# Patient Record
Sex: FEMALE | Race: WHITE | NOT HISPANIC OR LATINO | Employment: OTHER | URBAN - METROPOLITAN AREA
[De-identification: names, ages, dates, MRNs, and addresses within clinical notes are randomized per-mention and may not be internally consistent; named-entity substitution may affect disease eponyms.]

---

## 2023-06-03 LAB
ALANINE AMINOTRANSFERASE (SGPT) (U/L) IN SER/PLAS: 11 U/L (ref 7–45)
ALBUMIN (G/DL) IN SER/PLAS: 4 G/DL (ref 3.4–5)
ALKALINE PHOSPHATASE (U/L) IN SER/PLAS: 58 U/L (ref 33–136)
ANION GAP IN SER/PLAS: 8 MMOL/L (ref 10–20)
ASPARTATE AMINOTRANSFERASE (SGOT) (U/L) IN SER/PLAS: 14 U/L (ref 9–39)
BILIRUBIN TOTAL (MG/DL) IN SER/PLAS: 0.5 MG/DL (ref 0–1.2)
CALCIUM (MG/DL) IN SER/PLAS: 9.7 MG/DL (ref 8.6–10.3)
CARBON DIOXIDE, TOTAL (MMOL/L) IN SER/PLAS: 30 MMOL/L (ref 21–32)
CHLORIDE (MMOL/L) IN SER/PLAS: 106 MMOL/L (ref 98–107)
CHOLESTEROL (MG/DL) IN SER/PLAS: 191 MG/DL (ref 0–199)
CHOLESTEROL IN HDL (MG/DL) IN SER/PLAS: 47.7 MG/DL
CHOLESTEROL/HDL RATIO: 4
COBALAMIN (VITAMIN B12) (PG/ML) IN SER/PLAS: 350 PG/ML (ref 211–911)
CREATININE (MG/DL) IN SER/PLAS: 0.86 MG/DL (ref 0.5–1.05)
ERYTHROCYTE DISTRIBUTION WIDTH (RATIO) BY AUTOMATED COUNT: 12.7 % (ref 11.5–14.5)
ERYTHROCYTE MEAN CORPUSCULAR HEMOGLOBIN CONCENTRATION (G/DL) BY AUTOMATED: 32.2 G/DL (ref 32–36)
ERYTHROCYTE MEAN CORPUSCULAR VOLUME (FL) BY AUTOMATED COUNT: 90 FL (ref 80–100)
ERYTHROCYTES (10*6/UL) IN BLOOD BY AUTOMATED COUNT: 5.03 X10E12/L (ref 4–5.2)
GFR FEMALE: 71 ML/MIN/1.73M2
GLUCOSE (MG/DL) IN SER/PLAS: 104 MG/DL (ref 74–99)
HEMATOCRIT (%) IN BLOOD BY AUTOMATED COUNT: 45.4 % (ref 36–46)
HEMOGLOBIN (G/DL) IN BLOOD: 14.6 G/DL (ref 12–16)
LDL: 128 MG/DL (ref 0–99)
LEUKOCYTES (10*3/UL) IN BLOOD BY AUTOMATED COUNT: 7.9 X10E9/L (ref 4.4–11.3)
PLATELETS (10*3/UL) IN BLOOD AUTOMATED COUNT: 188 X10E9/L (ref 150–450)
POTASSIUM (MMOL/L) IN SER/PLAS: 4.4 MMOL/L (ref 3.5–5.3)
PROTEIN TOTAL: 6.7 G/DL (ref 6.4–8.2)
SODIUM (MMOL/L) IN SER/PLAS: 140 MMOL/L (ref 136–145)
TRIGLYCERIDE (MG/DL) IN SER/PLAS: 78 MG/DL (ref 0–149)
UREA NITROGEN (MG/DL) IN SER/PLAS: 19 MG/DL (ref 6–23)
VLDL: 16 MG/DL (ref 0–40)

## 2023-08-28 LAB
ANION GAP IN SER/PLAS: 10 MMOL/L (ref 10–20)
CALCIUM (MG/DL) IN SER/PLAS: 9.6 MG/DL (ref 8.6–10.3)
CARBON DIOXIDE, TOTAL (MMOL/L) IN SER/PLAS: 31 MMOL/L (ref 21–32)
CHLORIDE (MMOL/L) IN SER/PLAS: 103 MMOL/L (ref 98–107)
CHOLESTEROL (MG/DL) IN SER/PLAS: 127 MG/DL (ref 0–199)
CHOLESTEROL IN HDL (MG/DL) IN SER/PLAS: 41.3 MG/DL
CHOLESTEROL/HDL RATIO: 3.1
CREATININE (MG/DL) IN SER/PLAS: 0.95 MG/DL (ref 0.5–1.05)
ESTIMATED AVERAGE GLUCOSE FOR HBA1C: 131 MG/DL
GFR FEMALE: 63 ML/MIN/1.73M2
GLUCOSE (MG/DL) IN SER/PLAS: 96 MG/DL (ref 74–99)
HEMOGLOBIN A1C/HEMOGLOBIN TOTAL IN BLOOD: 6.2 %
LDL: 64 MG/DL (ref 0–99)
POTASSIUM (MMOL/L) IN SER/PLAS: 4 MMOL/L (ref 3.5–5.3)
SODIUM (MMOL/L) IN SER/PLAS: 140 MMOL/L (ref 136–145)
TRIGLYCERIDE (MG/DL) IN SER/PLAS: 107 MG/DL (ref 0–149)
UREA NITROGEN (MG/DL) IN SER/PLAS: 22 MG/DL (ref 6–23)
VLDL: 21 MG/DL (ref 0–40)

## 2023-09-01 ENCOUNTER — OFFICE VISIT (OUTPATIENT)
Dept: PRIMARY CARE | Facility: CLINIC | Age: 73
End: 2023-09-01
Payer: MEDICARE

## 2023-09-01 VITALS
TEMPERATURE: 96.8 F | HEART RATE: 70 BPM | BODY MASS INDEX: 29.23 KG/M2 | WEIGHT: 165 LBS | DIASTOLIC BLOOD PRESSURE: 80 MMHG | OXYGEN SATURATION: 97 % | SYSTOLIC BLOOD PRESSURE: 130 MMHG

## 2023-09-01 DIAGNOSIS — R73.01 IMPAIRED FASTING GLUCOSE: ICD-10-CM

## 2023-09-01 DIAGNOSIS — M48.02 CERVICAL STENOSIS OF SPINAL CANAL: ICD-10-CM

## 2023-09-01 DIAGNOSIS — G47.00 INSOMNIA, UNSPECIFIED TYPE: ICD-10-CM

## 2023-09-01 DIAGNOSIS — C67.9 UROTHELIAL CARCINOMA OF BLADDER (MULTI): ICD-10-CM

## 2023-09-01 DIAGNOSIS — E78.2 MIXED HYPERLIPIDEMIA: ICD-10-CM

## 2023-09-01 DIAGNOSIS — J96.11 CHRONIC HYPOXEMIC RESPIRATORY FAILURE (MULTI): Primary | ICD-10-CM

## 2023-09-01 DIAGNOSIS — G47.09 OTHER INSOMNIA: ICD-10-CM

## 2023-09-01 DIAGNOSIS — F32.A ANXIETY AND DEPRESSION: ICD-10-CM

## 2023-09-01 DIAGNOSIS — I10 BENIGN ESSENTIAL HYPERTENSION: ICD-10-CM

## 2023-09-01 DIAGNOSIS — G25.81 RESTLESS LEGS: ICD-10-CM

## 2023-09-01 DIAGNOSIS — I70.0 ATHEROSCLEROSIS OF AORTA (CMS-HCC): ICD-10-CM

## 2023-09-01 DIAGNOSIS — E03.9 HYPOTHYROIDISM, UNSPECIFIED TYPE: ICD-10-CM

## 2023-09-01 DIAGNOSIS — I65.01 VERTEBRAL ARTERY OCCLUSION, RIGHT: ICD-10-CM

## 2023-09-01 DIAGNOSIS — F41.9 ANXIETY AND DEPRESSION: ICD-10-CM

## 2023-09-01 DIAGNOSIS — K21.9 GASTROESOPHAGEAL REFLUX DISEASE, UNSPECIFIED WHETHER ESOPHAGITIS PRESENT: ICD-10-CM

## 2023-09-01 PROBLEM — J44.9 COPD (CHRONIC OBSTRUCTIVE PULMONARY DISEASE) (MULTI): Status: ACTIVE | Noted: 2023-09-01

## 2023-09-01 PROBLEM — L30.9 ECZEMA: Status: ACTIVE | Noted: 2023-09-01

## 2023-09-01 PROBLEM — N18.30 STAGE 3 CHRONIC KIDNEY DISEASE (MULTI): Status: ACTIVE | Noted: 2023-09-01

## 2023-09-01 PROBLEM — G47.33 OBSTRUCTIVE SLEEP APNEA SYNDROME: Status: ACTIVE | Noted: 2021-02-09

## 2023-09-01 PROBLEM — J44.1 CHRONIC OBSTRUCTIVE PULMONARY DISEASE WITH (ACUTE) EXACERBATION (MULTI): Status: ACTIVE | Noted: 2020-07-14

## 2023-09-01 PROBLEM — J30.9 ALLERGIC RHINITIS: Status: ACTIVE | Noted: 2023-09-01

## 2023-09-01 PROBLEM — M15.9 GENERALIZED OSTEOARTHRITIS: Status: ACTIVE | Noted: 2023-09-01

## 2023-09-01 PROBLEM — D49.4 NEOPLASM OF BLADDER: Status: ACTIVE | Noted: 2021-02-09

## 2023-09-01 PROBLEM — C67.4 MALIGNANT NEOPLASM OF POSTERIOR WALL OF URINARY BLADDER (MULTI): Status: ACTIVE | Noted: 2020-07-14

## 2023-09-01 PROCEDURE — 3075F SYST BP GE 130 - 139MM HG: CPT | Performed by: FAMILY MEDICINE

## 2023-09-01 PROCEDURE — 1036F TOBACCO NON-USER: CPT | Performed by: FAMILY MEDICINE

## 2023-09-01 PROCEDURE — 1159F MED LIST DOCD IN RCRD: CPT | Performed by: FAMILY MEDICINE

## 2023-09-01 PROCEDURE — 1160F RVW MEDS BY RX/DR IN RCRD: CPT | Performed by: FAMILY MEDICINE

## 2023-09-01 PROCEDURE — 1157F ADVNC CARE PLAN IN RCRD: CPT | Performed by: FAMILY MEDICINE

## 2023-09-01 PROCEDURE — 99214 OFFICE O/P EST MOD 30 MIN: CPT | Performed by: FAMILY MEDICINE

## 2023-09-01 PROCEDURE — 3079F DIAST BP 80-89 MM HG: CPT | Performed by: FAMILY MEDICINE

## 2023-09-01 RX ORDER — RIZATRIPTAN BENZOATE 10 MG/1
10 TABLET ORAL AS NEEDED
COMMUNITY

## 2023-09-01 RX ORDER — ALBUTEROL SULFATE 0.83 MG/ML
2.5 SOLUTION RESPIRATORY (INHALATION) 4 TIMES DAILY PRN
COMMUNITY
Start: 2020-10-01 | End: 2023-09-05 | Stop reason: WASHOUT

## 2023-09-01 RX ORDER — UMECLIDINIUM BROMIDE AND VILANTEROL TRIFENATATE 62.5; 25 UG/1; UG/1
1 POWDER RESPIRATORY (INHALATION) DAILY
COMMUNITY
End: 2024-01-29 | Stop reason: SDUPTHER

## 2023-09-01 RX ORDER — MOMETASONE FUROATE 1 MG/G
CREAM TOPICAL DAILY
COMMUNITY
Start: 2016-02-02 | End: 2023-10-25 | Stop reason: ALTCHOICE

## 2023-09-01 RX ORDER — TRAZODONE HYDROCHLORIDE 50 MG/1
50 TABLET ORAL NIGHTLY
Qty: 90 TABLET | Refills: 1 | Status: SHIPPED | OUTPATIENT
Start: 2023-09-01 | End: 2024-03-01 | Stop reason: SDUPTHER

## 2023-09-01 RX ORDER — MULTIVITAMIN
1 TABLET ORAL DAILY
COMMUNITY

## 2023-09-01 RX ORDER — ALBUTEROL SULFATE 90 UG/1
1-2 AEROSOL, METERED RESPIRATORY (INHALATION) EVERY 4 HOURS PRN
COMMUNITY

## 2023-09-01 RX ORDER — ACETAMINOPHEN 500 MG
1 TABLET ORAL DAILY
COMMUNITY
Start: 2007-12-19

## 2023-09-01 RX ORDER — LEVOTHYROXINE SODIUM 50 UG/1
50 TABLET ORAL DAILY
COMMUNITY
End: 2023-09-01 | Stop reason: SDUPTHER

## 2023-09-01 RX ORDER — LEVOTHYROXINE SODIUM 50 UG/1
50 TABLET ORAL DAILY
Qty: 90 TABLET | Refills: 1 | Status: SHIPPED | OUTPATIENT
Start: 2023-09-01 | End: 2024-03-01 | Stop reason: SDUPTHER

## 2023-09-01 RX ORDER — OMEPRAZOLE 20 MG/1
20 CAPSULE, DELAYED RELEASE ORAL DAILY
COMMUNITY
End: 2023-09-01 | Stop reason: SDUPTHER

## 2023-09-01 RX ORDER — BUPROPION HYDROCHLORIDE 300 MG/1
300 TABLET ORAL DAILY
Qty: 90 TABLET | Refills: 1 | Status: SHIPPED | OUTPATIENT
Start: 2023-09-01 | End: 2024-03-01 | Stop reason: SDUPTHER

## 2023-09-01 RX ORDER — ROPINIROLE 3 MG/1
3 TABLET, FILM COATED ORAL 3 TIMES DAILY
COMMUNITY
End: 2023-11-30

## 2023-09-01 RX ORDER — METOPROLOL TARTRATE 50 MG/1
25 TABLET ORAL 2 TIMES DAILY
COMMUNITY

## 2023-09-01 RX ORDER — FLUTICASONE PROPIONATE 50 MCG
1 SPRAY, SUSPENSION (ML) NASAL 2 TIMES DAILY
COMMUNITY

## 2023-09-01 RX ORDER — MONTELUKAST SODIUM 10 MG/1
10 TABLET ORAL NIGHTLY
COMMUNITY
End: 2024-03-04

## 2023-09-01 RX ORDER — CITALOPRAM 10 MG/1
10 TABLET ORAL DAILY
Qty: 90 TABLET | Refills: 1 | Status: SHIPPED | OUTPATIENT
Start: 2023-09-01 | End: 2024-03-01 | Stop reason: SDUPTHER

## 2023-09-01 RX ORDER — BUPROPION HYDROCHLORIDE 300 MG/1
300 TABLET ORAL DAILY
COMMUNITY
End: 2023-09-01 | Stop reason: SDUPTHER

## 2023-09-01 RX ORDER — CITALOPRAM 10 MG/1
10 TABLET ORAL DAILY
COMMUNITY
End: 2023-09-01 | Stop reason: SDUPTHER

## 2023-09-01 RX ORDER — OMEPRAZOLE 20 MG/1
20 CAPSULE, DELAYED RELEASE ORAL DAILY
Qty: 90 CAPSULE | Refills: 1 | Status: SHIPPED | OUTPATIENT
Start: 2023-09-01 | End: 2024-03-01 | Stop reason: SDUPTHER

## 2023-09-01 RX ORDER — TRAZODONE HYDROCHLORIDE 50 MG/1
50 TABLET ORAL NIGHTLY
COMMUNITY
Start: 2016-08-02 | End: 2023-09-01 | Stop reason: SDUPTHER

## 2023-09-01 RX ORDER — CHOLECALCIFEROL (VITAMIN D3) 25 MCG
25 TABLET ORAL DAILY
COMMUNITY
Start: 2016-03-08 | End: 2023-10-25 | Stop reason: ALTCHOICE

## 2023-09-01 ASSESSMENT — ENCOUNTER SYMPTOMS: NECK PAIN: 1

## 2023-09-01 NOTE — PROGRESS NOTES
Assessment     ASSESSMENT/PLAN:      Problem List Items Addressed This Visit       Anxiety and depression    Relevant Medications    citalopram (CeleXA) 10 mg tablet    buPROPion XL (Wellbutrin XL) 300 mg 24 hr tablet    Atherosclerosis of aorta (CMS/HCC)    Benign essential hypertension    Relevant Orders    Basic Metabolic Panel    Chronic hypoxemic respiratory failure (CMS/HCC) - Primary    GERD (gastroesophageal reflux disease)    Relevant Medications    omeprazole (PriLOSEC) 20 mg DR capsule    Hyperlipidemia    Relevant Orders    Lipid Panel    Hypothyroidism    Relevant Medications    levothyroxine (Synthroid, Levoxyl) 50 mcg tablet    Other Relevant Orders    Tsh With Reflex To Free T4 If Abnormal    Impaired fasting glucose    Relevant Orders    Hemoglobin A1C    Insomnia    Relevant Medications    traZODone (Desyrel) 50 mg tablet    Restless legs    Urothelial carcinoma of bladder (CMS/HCC)    Cervical stenosis of spinal canal    Vertebral artery occlusion, right    Relevant Orders    Vascular US carotid artery duplex bilateral       Patient Instructions:  Patient Instructions   Presyncope: We will recheck coronary artery with ultrasound, recheck TSH  Preventative: Labs reviewed, last colonoscopy  2 repeat in 5 years      Signed by: Gloria Potter DO       FUTURE DIRECTION:   If U/S is nml, consider decreasing ropinorle to 2mg TID     Subjective   SUBJECTIVE:     HPI : Patient is a 72 y.o. female who presents today for the following:     MIK: CPAP started 2021  HTN: Metoprolol tartrate 50 mg,  HLD: controlled  Hypothyroid: Levothyroxine 50 mcg  GERD: Omeprazole 20 mg  Migraines: Maxalt 10 mg  RLS: Ropinirole 3 mg  Asthma: Albuterol inhaler, Trelegy Ellipta, home nebulizer  Depression: Wellbutrin 200 mg, Celexa 10 mg  - brother  3/1/22 from heart attack   Eczema: Mometasone    Recurrent falls: states that she has been feeling like her body leans to one side right before she falls. Kavya  being tripped   - has been following Neurology and had MRI brain and Cspine.  MRI C-spine shows + new spinal stenosis in cervical region.  -Physical therapy was recommended however patient states that she is unable to go due to severe neck pain      Past medical history of bladder cancer: S/p transurethral resection on 7/3/2019, done by Dr. Cornell, cystoscope q 3 months,   - recent biopsy showed low grade cancer from pathology, repeat cystoscope 6/2022 was normal   - s/p miduretheral sling procedure 7/2022  Preventative: last colonoscopy 2022, repeat 5 yrs             Care Team:   Urology:    Pulmonology:    Colonoscopy:    Neurology:      Review of Systems   Musculoskeletal:  Positive for neck pain.       Past Medical History:   Diagnosis Date    Enterocolitis due to Clostridium difficile, not specified as recurrent 07/01/2015    C. difficile colitis    Personal history of malignant neoplasm of bladder     History of malignant neoplasm of bladder    Personal history of other diseases of the musculoskeletal system and connective tissue 07/01/2015    History of arthritis    Personal history of other diseases of the musculoskeletal system and connective tissue 07/01/2015    History of osteoarthritis    Personal history of other diseases of the nervous system and sense organs 07/01/2015    History of migraine    Personal history of other diseases of urinary system 07/01/2015    History of chronic kidney disease    Personal history of other mental and behavioral disorders 07/01/2015    History of depression    Restless legs syndrome 07/01/2015    Restless legs syndrome        Past Surgical History:   Procedure Laterality Date    CT NECK ANGIO W AND WO IV CONTRAST  12/1/2022    CT NECK ANGIO W AND WO IV CONTRAST 12/1/2022 [facility]    OTHER SURGICAL HISTORY  05/29/2019    Dilation and curettage    OTHER SURGICAL HISTORY  05/29/2019    Cervical disc replacement    OTHER SURGICAL  "HISTORY  2019    Rotator cuff repair        Current Outpatient Medications   Medication Instructions    albuterol 90 mcg/actuation inhaler 1-2 puffs, inhalation, Every 4 hours PRN, ProAir    albuterol 2.5 mg, nebulization, 4 times daily PRN    Anoro Ellipta 62.5-25 mcg/actuation blister with device 1 puff, inhalation, Daily    buPROPion XL (WELLBUTRIN XL) 300 mg, oral, Daily    calcium carbonate-vitamin D3 (Caltrate with Vitamin D3) 600 mg-20 mcg (800 unit) tablet 1 tablet, oral, Daily    cholecalciferol (VITAMIN D-3) 25 mcg, oral, Daily    citalopram (CELEXA) 10 mg, oral, Daily    diclofenac sodium 1 % kit Topical    fluticasone (Flonase) 50 mcg/actuation nasal spray 1 spray, Each Nostril, 2 times daily    levothyroxine (SYNTHROID, LEVOXYL) 50 mcg, oral, Daily    metoprolol tartrate (LOPRESSOR) 25 mg, oral, 2 times daily    mometasone (Elocon) 0.1 % cream Topical, Daily    montelukast (SINGULAIR) 10 mg, oral, Nightly    multivitamin (Daily Multi-Vitamin) tablet 1 tablet, oral, Daily    omeprazole (PRILOSEC) 20 mg, oral, Daily    rizatriptan (MAXALT) 10 mg, oral, As needed    rOPINIRole (REQUIP) 3 mg, oral, 3 times daily    traZODone (DESYREL) 50 mg, oral, Nightly        Allergies   Allergen Reactions    Metoclopramide Hcl Palpitations, Shortness of breath and Other     chest pain    Sulfasalazine Anaphylaxis and Other     severe leukopenia    \"almost ,attacked my blood, thraot closed, intubated\"    Meloxicam Rash     rash    Sulfamethoxazole Other        Social History     Socioeconomic History    Marital status:      Spouse name: Not on file    Number of children: Not on file    Years of education: Not on file    Highest education level: Not on file   Occupational History    Not on file   Tobacco Use    Smoking status: Never    Smokeless tobacco: Never   Substance and Sexual Activity    Alcohol use: Not on file    Drug use: Not on file    Sexual activity: Not on file   Other Topics Concern    Not " on file   Social History Narrative    Not on file     Social Determinants of Health     Financial Resource Strain: Not on file   Food Insecurity: Not on file   Transportation Needs: Not on file   Physical Activity: Not on file   Stress: Not on file   Social Connections: Not on file   Intimate Partner Violence: Not on file   Housing Stability: Not on file        No family history on file.     Objective     OBJECTIVE:     Vitals:    09/01/23 0825   BP: 130/80   Pulse: 70   Temp: 36 °C (96.8 °F)   SpO2: 97%   Weight: 74.8 kg (165 lb)        Physical Exam  HENT:      Head: Normocephalic and atraumatic.      Nose: Nose normal.      Mouth/Throat:      Mouth: Mucous membranes are moist.   Eyes:      Pupils: Pupils are equal, round, and reactive to light.   Cardiovascular:      Rate and Rhythm: Normal rate and regular rhythm.      Pulses: Normal pulses.      Heart sounds: No murmur heard.  Pulmonary:      Effort: Pulmonary effort is normal.      Breath sounds: Normal breath sounds.   Abdominal:      Tenderness: There is no abdominal tenderness.   Musculoskeletal:         General: Normal range of motion.      Cervical back: Normal range of motion.   Skin:     General: Skin is warm and dry.   Neurological:      Mental Status: She is alert.   Psychiatric:         Mood and Affect: Mood normal.

## 2023-09-01 NOTE — PATIENT INSTRUCTIONS
Presyncope: We will recheck coronary artery with ultrasound, recheck TSH, discussed decreasing propranolol  Preventative: Labs reviewed, last colonoscopy 2020 2 repeat in 5 years

## 2023-09-05 ENCOUNTER — TELEMEDICINE (OUTPATIENT)
Dept: PRIMARY CARE | Facility: CLINIC | Age: 73
End: 2023-09-05
Payer: MEDICARE

## 2023-09-05 DIAGNOSIS — J96.11 CHRONIC HYPOXEMIC RESPIRATORY FAILURE (MULTI): ICD-10-CM

## 2023-09-05 DIAGNOSIS — U07.1 COVID: Primary | ICD-10-CM

## 2023-09-05 PROCEDURE — 99214 OFFICE O/P EST MOD 30 MIN: CPT | Performed by: PHYSICIAN ASSISTANT

## 2023-09-05 ASSESSMENT — ENCOUNTER SYMPTOMS
ABDOMINAL PAIN: 0
COUGH: 1

## 2023-09-05 NOTE — PROGRESS NOTES
Subjective   Patient ID: Magda Paz is a 72 y.o. female who presents for URI (Test positive for COVID).    Virtual or Telephone Consent    An interactive audio and video telecommunication system which permits real time communications between the patient (at the originating site) and provider (at the distant site) was utilized to provide this telehealth service.   Verbal consent was requested and obtained from Magda Paz on this date, 09/05/23 for a telehealth visit.     HPI   Patient complains of having congestion and dry cough starting yesterday.   No noted fevers. Mild headaches (that resolved with tylenol use). Has chronic respiratory problems and the illness has flared that up slightly-- just feels mildly SOB. No chest pain. Did COVID test last night and this morning and they were both positive. No significant diarrhea or vomiting.   Used a little Tylenol and robitussin yesterday.   Using her Anoro inhaler, and has only needed to use her albuterol once.   Has checked her pulse ox and its been about 90 to 91% and she is normally about 94% at her baseline at home.    Not sure where she caught COVID at.    Has gotten COVID vaccine and boosters.  Former smoker.  Past Medical History:   Diagnosis Date    Enterocolitis due to Clostridium difficile, not specified as recurrent 07/01/2015    C. difficile colitis    Personal history of malignant neoplasm of bladder     History of malignant neoplasm of bladder    Personal history of other diseases of the musculoskeletal system and connective tissue 07/01/2015    History of arthritis    Personal history of other diseases of the musculoskeletal system and connective tissue 07/01/2015    History of osteoarthritis    Personal history of other diseases of the nervous system and sense organs 07/01/2015    History of migraine    Personal history of other diseases of urinary system 07/01/2015    History of chronic kidney disease    Personal history of other mental and  behavioral disorders 2015    History of depression    Restless legs syndrome 2015    Restless legs syndrome      No family history on file.   Social History     Tobacco Use    Smoking status: Former     Types: Cigarettes     Quit date:      Years since quittin.6    Smokeless tobacco: Never        Review of Systems   Respiratory:  Positive for cough.    Cardiovascular:  Negative for chest pain.   Gastrointestinal:  Negative for abdominal pain.       Objective   There were no vitals taken for this visit.    Physical Exam  Constitutional:       Appearance: Normal appearance.   HENT:      Head: Normocephalic.   Eyes:      General: No scleral icterus.  Pulmonary:      Effort: Pulmonary effort is normal. No respiratory distress.   Neurological:      Mental Status: She is alert.   Psychiatric:         Mood and Affect: Mood normal.         Assessment/Plan   Diagnoses and all orders for this visit:  COVID  -     nirmatrelvir-ritonavir (PAXLOVID) 300 mg (150 mg x 2)-100 mg tablet therapy pack; Take 3 tablets by mouth 2 times a day for 5 days. Follow the instructions on the package  Chronic hypoxemic respiratory failure (CMS/HCC)  -     nirmatrelvir-ritonavir (PAXLOVID) 300 mg (150 mg x 2)-100 mg tablet therapy pack; Take 3 tablets by mouth 2 times a day for 5 days. Follow the instructions on the package       Discussed nature of COVID illness. Recommend self-isolation for minimum of 5 days after symptoms started and only can end isolation then if symptoms are significantly improved and has had no fever for 24 hours without the use of fever reducing medication. Should then wear mask for minimum of a full 10 days.  Cough may linger for a few weeks. Wear a mask around all contacts until cough resolves. Go to ER if develops severe SOB or severe chest pains or if her pulse ox drops into the mid upper 80s consistently.  Can use Tylenol or ibuprofen for fever, headaches, and aches if needed. Use Mucinex DM for  cough/congestion. Hydrate heavily. Discussed use of Paxlovid, and pt is interested in using it. Rx Paxlovid. Advised to hold using her trazodone while on the paxlovid (pt states she doesn't use it daily anyway). Follow up if symptoms increase or persist.        Duration of video visit: 12m 43s

## 2023-10-03 ENCOUNTER — HOSPITAL ENCOUNTER (OUTPATIENT)
Dept: VASCULAR MEDICINE | Facility: HOSPITAL | Age: 73
Discharge: HOME | End: 2023-10-03
Payer: MEDICARE

## 2023-10-03 ENCOUNTER — LAB (OUTPATIENT)
Dept: LAB | Facility: LAB | Age: 73
End: 2023-10-03
Payer: MEDICARE

## 2023-10-03 DIAGNOSIS — E03.9 HYPOTHYROIDISM, UNSPECIFIED TYPE: ICD-10-CM

## 2023-10-03 DIAGNOSIS — R09.89 BILATERAL CAROTID BRUITS: ICD-10-CM

## 2023-10-03 LAB — TSH SERPL-ACNC: 1.84 MIU/L (ref 0.44–3.98)

## 2023-10-03 PROCEDURE — 93880 EXTRACRANIAL BILAT STUDY: CPT | Performed by: SURGERY

## 2023-10-03 PROCEDURE — 93880 EXTRACRANIAL BILAT STUDY: CPT

## 2023-10-03 PROCEDURE — 36415 COLL VENOUS BLD VENIPUNCTURE: CPT

## 2023-10-03 PROCEDURE — 84443 ASSAY THYROID STIM HORMONE: CPT

## 2023-10-04 ENCOUNTER — TELEPHONE (OUTPATIENT)
Dept: PRIMARY CARE | Facility: CLINIC | Age: 73
End: 2023-10-04
Payer: MEDICARE

## 2023-10-04 DIAGNOSIS — I77.1 SUBCLAVIAN ARTERY STENOSIS (CMS-HCC): Primary | ICD-10-CM

## 2023-10-05 NOTE — PROGRESS NOTES
"Counseling:  The patient was counseled regarding diagnostic results, instructions for management, risk factor reductions, prognosis, patient and family education, impressions, risks and benefits of treatment options and importance of compliance with treatment.      Chief Complaint:   The patient presents today for 2-month followup of HTN, dizziness and presyncope s/p Holter and echocardiogram.       History Of Present Illness:    Magda Bill is a 72 year old female patient who presents today for 2-month followup of HTN, dizziness and presyncope s/p Holter and echocardiogram. Her PMH is significant for HTN, atherosclerosis of aorta, urothelial cancer of the bladder, COPD, GERD, hyperlipidemia, migraines, MIK, and CKD stage 3. Echocardiogram performed 08/11/2023 demonstrated an EF of 60-65%. The patient presents today to discuss the results of a carotid ultrasound as ordered by her PCP.  Carotid duplex performed 10/03/2023 revealed findings consistent with less than 50% stenosis of bilateral proximal ICAs and 50% stenosis of the right subclavian artery. She reports LUE discomfort, and denies any RUE symptoms.      Last Recorded Vitals:  Vitals:    10/06/23 1035   BP: 158/76   BP Location: Right arm   Pulse: 58   Weight: 75.1 kg (165 lb 9.6 oz)   Height: 1.6 m (5' 3\")       Past Medical History:  She has a past medical history of Enterocolitis due to Clostridium difficile, not specified as recurrent (07/01/2015), Personal history of malignant neoplasm of bladder, Personal history of other diseases of the musculoskeletal system and connective tissue (07/01/2015), Personal history of other diseases of the musculoskeletal system and connective tissue (07/01/2015), Personal history of other diseases of the nervous system and sense organs (07/01/2015), Personal history of other diseases of urinary system (07/01/2015), Personal history of other mental and behavioral disorders (07/01/2015), and Restless legs syndrome " (07/01/2015).    Past Surgical History:  She has a past surgical history that includes Other surgical history (05/29/2019); Other surgical history (05/29/2019); Other surgical history (05/29/2019); and CT angio neck w and wo IV contrast (12/1/2022).      Social History:  She reports that she quit smoking about 8 years ago. Her smoking use included cigarettes. She has never used smokeless tobacco. She reports that she does not currently use alcohol. She reports that she does not use drugs.    Family History:  No family history on file.     Allergies:  Metoclopramide hcl, Sulfasalazine, Meloxicam, and Sulfamethoxazole    Outpatient Medications:  Current Outpatient Medications   Medication Instructions    albuterol 90 mcg/actuation inhaler 1-2 puffs, inhalation, Every 4 hours PRN, ProAir    Anoro Ellipta 62.5-25 mcg/actuation blister with device 1 puff, inhalation, Daily    buPROPion XL (WELLBUTRIN XL) 300 mg, oral, Daily    calcium carbonate-vitamin D3 (Caltrate with Vitamin D3) 600 mg-20 mcg (800 unit) tablet 1 tablet, oral, Daily    cholecalciferol (VITAMIN D-3) 25 mcg, oral, Daily    citalopram (CELEXA) 10 mg, oral, Daily    diclofenac sodium 1 % kit Topical    famotidine (PEPCID) 40 mg, oral, Nightly    fluticasone (Flonase) 50 mcg/actuation nasal spray 1 spray, Each Nostril, 2 times daily    levothyroxine (SYNTHROID, LEVOXYL) 50 mcg, oral, Daily    metoprolol tartrate (LOPRESSOR) 25 mg, oral, 2 times daily    mometasone (Elocon) 0.1 % cream Topical, Daily    montelukast (SINGULAIR) 10 mg, oral, Nightly    multivitamin (Daily Multi-Vitamin) tablet 1 tablet, oral, Daily    omeprazole (PRILOSEC) 20 mg, oral, Daily    rizatriptan (MAXALT) 10 mg, oral, As needed    rOPINIRole (REQUIP) 3 mg, oral, 3 times daily    traZODone (DESYREL) 50 mg, oral, Nightly     Review of Systems   Musculoskeletal:         LUE discomfort   All other systems reviewed and are negative.     Physical Exam:  Constitutional:       Appearance:  Healthy appearance. Not in distress.   Neck:      Vascular: No JVR. JVD normal.   Pulmonary:      Effort: Pulmonary effort is normal.      Breath sounds: Normal breath sounds. No wheezing. No rhonchi. No rales.   Chest:      Chest wall: Not tender to palpatation.   Cardiovascular:      PMI at left midclavicular line. Normal rate. Regular rhythm. Normal S1. Normal S2.       Murmurs: There is no murmur.      No gallop.  No click. No rub.   Pulses:     Intact distal pulses.   Edema:     Peripheral edema absent.   Abdominal:      General: Bowel sounds are normal.      Palpations: Abdomen is soft.      Tenderness: There is no abdominal tenderness.   Musculoskeletal: Normal range of motion.         General: No tenderness. Skin:     General: Skin is warm and dry.   Neurological:      General: No focal deficit present.      Mental Status: Alert and oriented to person, place and time.        Last Labs:  CBC -  Lab Results   Component Value Date    WBC 7.9 06/03/2023    HGB 14.6 06/03/2023    HCT 45.4 06/03/2023    MCV 90 06/03/2023     06/03/2023       CMP -  Lab Results   Component Value Date    CALCIUM 9.6 08/28/2023    PHOS 3.3 11/29/2022    PROT 6.7 06/03/2023    ALBUMIN 4.0 06/03/2023    AST 14 06/03/2023    ALT 11 06/03/2023    ALKPHOS 58 06/03/2023    BILITOT 0.5 06/03/2023       LIPID PANEL -   Lab Results   Component Value Date    CHOL 127 08/28/2023    TRIG 107 08/28/2023    HDL 41.3 08/28/2023    CHHDL 3.1 08/28/2023    LDLF 64 08/28/2023    VLDL 21 08/28/2023       RENAL FUNCTION PANEL -   Lab Results   Component Value Date    GLUCOSE 96 08/28/2023     08/28/2023    K 4.0 08/28/2023     08/28/2023    CO2 31 08/28/2023    ANIONGAP 10 08/28/2023    BUN 22 08/28/2023    CREATININE 0.95 08/28/2023    CALCIUM 9.6 08/28/2023    PHOS 3.3 11/29/2022    ALBUMIN 4.0 06/03/2023        Lab Results   Component Value Date    BNP 29 09/29/2020    HGBA1C 6.2 (A) 08/28/2023       Last Cardiology  Tests:  10/03/2023 - Vascular Lab Carotid Artery Duplex  1. Right Carotid: Findings are consistent with less than 50% stenosis of the right proximal internal carotid artery. Laminar flow seen by color Doppler. Right external carotid artery appears patent with no evidence of stenosis. The right vertebral artery demonstrates retrograde flow which may be suggestive of a more proximal stenosis or occlusion. There is a >50% stenosis noted in the right subclavian artery.  2. Left Carotid: Findings are consistent with less than 50% stenosis of the left proximal internal carotid artery. Laminar flow seen by color Doppler. Left external carotid artery appears patent with no evidence of stenosis. The left vertebral artery is patent with antegrade flow. No evidence of hemodynamically significant stenosis in the left subclavian artery.    08/11/2023 - TTE  Left ventricular systolic function is normal with a 60-65% estimated ejection fraction.     06/22/2023 to 07/04/2023 - Holter Monitor  1. Predominant underlying rhythm was sinus rhythm; min HR 48 bpm, max  bpm, avg HR 67 bpm.  2. 3 supraventricular tachycardia runs occurred; fastest interval lasting 4 beats with max rate 136 bpm, longest lasting 6 beats with avg rage 108 bpm.     08/17/2020 - CT Chest  Emphysematous changes. Evidence of previous granulomatous infection. No acute findings.     08/06/2020 - Stress Test  1. Normal stress myocardial perfusion imaging in response to pharmacologic stress. Well-maintained left ventricular function.  2. No clinical or electrocardiographic evidence for ischemia at maximal infusion. There was QT prolongation with Lexiscan.      08/06/2020 - TTE  1. The left ventricular systolic function is normal.  2. Aortic valve stenosis is not present.    Diagnostic review: I have personally reviewed the result(s) of the Carotid Duplex.     Assessment/Plan   1) Exertional CP and SOB  Negative Echocardiogram and stress test  SOB probably  secondary to COPD  Seen by pulmonology 07/18/2023- CT scan ordered for November with 6-month followup arranged      2) HTN, Dizziness, Presyncope   2 month h/o of elevated blood pressures in the 160-180 range and dizziness with associated presyncope  Denies true syncope  Saw Dr. Alexandre, neurology, 06/01/2023, who ordered an MRI brain  MRI brain with parenchyma hyperintensities that may be s/t HTN  MRI of spine pending   CTA neck 12/01/2022 with occlusion of the right vertebral artery with partial reconstitution/collateralization  Labs 06/03/2023 with stable blood count, stable liver and kidney function, and lipids with an elevated LDL of 128  On lisinopril 10 mg once daily (started in 06/2023)  Holter monitoring with 3 runs of SVT  TTE 08/11/2023 with LVEF 60-65%   Reports only very occasional dizziness  Carotid duplex 10/03/2023 with less than 50% stenosis of bilateral proximal ICAs, 50% stenosis of the right subclavian artery  Reports LUE discomfort  Denies any RUE symptoms  Check CTA neck      Scribe Attestation  By signing my name below, I, Trevor Gtz   attest that this documentation has been prepared under the direction and in the presence of Pablo Walker MD.

## 2023-10-06 ENCOUNTER — OFFICE VISIT (OUTPATIENT)
Dept: CARDIOLOGY | Facility: CLINIC | Age: 73
End: 2023-10-06
Payer: MEDICARE

## 2023-10-06 VITALS
SYSTOLIC BLOOD PRESSURE: 158 MMHG | DIASTOLIC BLOOD PRESSURE: 76 MMHG | WEIGHT: 165.6 LBS | BODY MASS INDEX: 29.34 KG/M2 | HEIGHT: 63 IN | HEART RATE: 58 BPM

## 2023-10-06 DIAGNOSIS — I70.0 ATHEROSCLEROSIS OF AORTA (CMS-HCC): ICD-10-CM

## 2023-10-06 DIAGNOSIS — I10 BENIGN ESSENTIAL HYPERTENSION: Primary | ICD-10-CM

## 2023-10-06 DIAGNOSIS — I65.01 VERTEBRAL ARTERY OCCLUSION, RIGHT: ICD-10-CM

## 2023-10-06 PROCEDURE — 3077F SYST BP >= 140 MM HG: CPT | Performed by: INTERNAL MEDICINE

## 2023-10-06 PROCEDURE — 1036F TOBACCO NON-USER: CPT | Performed by: INTERNAL MEDICINE

## 2023-10-06 PROCEDURE — 1159F MED LIST DOCD IN RCRD: CPT | Performed by: INTERNAL MEDICINE

## 2023-10-06 PROCEDURE — 3078F DIAST BP <80 MM HG: CPT | Performed by: INTERNAL MEDICINE

## 2023-10-06 PROCEDURE — 1160F RVW MEDS BY RX/DR IN RCRD: CPT | Performed by: INTERNAL MEDICINE

## 2023-10-06 PROCEDURE — 99212 OFFICE O/P EST SF 10 MIN: CPT | Performed by: INTERNAL MEDICINE

## 2023-10-06 RX ORDER — FAMOTIDINE 40 MG/1
40 TABLET, FILM COATED ORAL NIGHTLY
COMMUNITY
Start: 2023-09-02 | End: 2024-06-03

## 2023-10-06 ASSESSMENT — ENCOUNTER SYMPTOMS
OCCASIONAL FEELINGS OF UNSTEADINESS: 1
LOSS OF SENSATION IN FEET: 0
DEPRESSION: 0

## 2023-10-06 NOTE — PATIENT INSTRUCTIONS
Continue all current medications as prescribed.   Dr. Walker has ordered a CT scan of your neck to followup on the carotids.  Followup with Dr. Walker after the above test.    If you have any questions or cardiac concerns, please call our office at [phone].

## 2023-10-06 NOTE — LETTER
"October 6, 2023     Gloria Potter DO  [address] [place] [id number]    Patient: Magda Pza   YOB: 1950   Date of Visit: 10/6/2023       Dear Dr. Gloria Potter DO:    Thank you for referring Magda Paz to me for evaluation. Below are my notes for this consultation.  If you have questions, please do not hesitate to call me. I look forward to following your patient along with you.       Sincerely,     Pablo Walker MD      CC: No Recipients  ______________________________________________________________________________________    Counseling:  The patient was counseled regarding diagnostic results, instructions for management, risk factor reductions, prognosis, patient and family education, impressions, risks and benefits of treatment options and importance of compliance with treatment.      Chief Complaint:   The patient presents today for 2-month followup of HTN, dizziness and presyncope s/p Holter and echocardiogram.       History Of Present Illness:    Magda Bill is a 72 year old female patient who presents today for 2-month followup of HTN, dizziness and presyncope s/p Holter and echocardiogram. Her PMH is significant for HTN, atherosclerosis of aorta, urothelial cancer of the bladder, COPD, GERD, hyperlipidemia, migraines, MIK, and CKD stage 3. Echocardiogram performed 08/11/2023 demonstrated an EF of 60-65%. The patient presents today to discuss the results of a carotid ultrasound as ordered by her PCP.  Carotid duplex performed 10/03/2023 revealed findings consistent with less than 50% stenosis of bilateral proximal ICAs and 50% stenosis of the right subclavian artery. She reports LUE discomfort, and denies any RUE symptoms.      Last Recorded Vitals:  Vitals:    10/06/23 1035   BP: 158/76   BP Location: Right arm   Pulse: 58   Weight: 75.1 kg (165 lb 9.6 oz)   Height: 1.6 m (5' 3\")       Past Medical History:  She has a past medical history of " Enterocolitis due to Clostridium difficile, not specified as recurrent (07/01/2015), Personal history of malignant neoplasm of bladder, Personal history of other diseases of the musculoskeletal system and connective tissue (07/01/2015), Personal history of other diseases of the musculoskeletal system and connective tissue (07/01/2015), Personal history of other diseases of the nervous system and sense organs (07/01/2015), Personal history of other diseases of urinary system (07/01/2015), Personal history of other mental and behavioral disorders (07/01/2015), and Restless legs syndrome (07/01/2015).    Past Surgical History:  She has a past surgical history that includes Other surgical history (05/29/2019); Other surgical history (05/29/2019); Other surgical history (05/29/2019); and CT angio neck w and wo IV contrast (12/1/2022).      Social History:  She reports that she quit smoking about 8 years ago. Her smoking use included cigarettes. She has never used smokeless tobacco. She reports that she does not currently use alcohol. She reports that she does not use drugs.    Family History:  No family history on file.     Allergies:  Metoclopramide hcl, Sulfasalazine, Meloxicam, and Sulfamethoxazole    Outpatient Medications:  Current Outpatient Medications   Medication Instructions   • albuterol 90 mcg/actuation inhaler 1-2 puffs, inhalation, Every 4 hours PRN, ProAir   • Anoro Ellipta 62.5-25 mcg/actuation blister with device 1 puff, inhalation, Daily   • buPROPion XL (WELLBUTRIN XL) 300 mg, oral, Daily   • calcium carbonate-vitamin D3 (Caltrate with Vitamin D3) 600 mg-20 mcg (800 unit) tablet 1 tablet, oral, Daily   • cholecalciferol (VITAMIN D-3) 25 mcg, oral, Daily   • citalopram (CELEXA) 10 mg, oral, Daily   • diclofenac sodium 1 % kit Topical   • famotidine (PEPCID) 40 mg, oral, Nightly   • fluticasone (Flonase) 50 mcg/actuation nasal spray 1 spray, Each Nostril, 2 times daily   • levothyroxine (SYNTHROID,  LEVOXYL) 50 mcg, oral, Daily   • metoprolol tartrate (LOPRESSOR) 25 mg, oral, 2 times daily   • mometasone (Elocon) 0.1 % cream Topical, Daily   • montelukast (SINGULAIR) 10 mg, oral, Nightly   • multivitamin (Daily Multi-Vitamin) tablet 1 tablet, oral, Daily   • omeprazole (PRILOSEC) 20 mg, oral, Daily   • rizatriptan (MAXALT) 10 mg, oral, As needed   • rOPINIRole (REQUIP) 3 mg, oral, 3 times daily   • traZODone (DESYREL) 50 mg, oral, Nightly     Review of Systems   Musculoskeletal:         LUE discomfort   All other systems reviewed and are negative.     Physical Exam:  Constitutional:       Appearance: Healthy appearance. Not in distress.   Neck:      Vascular: No JVR. JVD normal.   Pulmonary:      Effort: Pulmonary effort is normal.      Breath sounds: Normal breath sounds. No wheezing. No rhonchi. No rales.   Chest:      Chest wall: Not tender to palpatation.   Cardiovascular:      PMI at left midclavicular line. Normal rate. Regular rhythm. Normal S1. Normal S2.       Murmurs: There is no murmur.      No gallop.  No click. No rub.   Pulses:     Intact distal pulses.   Edema:     Peripheral edema absent.   Abdominal:      General: Bowel sounds are normal.      Palpations: Abdomen is soft.      Tenderness: There is no abdominal tenderness.   Musculoskeletal: Normal range of motion.         General: No tenderness. Skin:     General: Skin is warm and dry.   Neurological:      General: No focal deficit present.      Mental Status: Alert and oriented to person, place and time.        Last Labs:  CBC -  Lab Results   Component Value Date    WBC 7.9 06/03/2023    HGB 14.6 06/03/2023    HCT 45.4 06/03/2023    MCV 90 06/03/2023     06/03/2023       CMP -  Lab Results   Component Value Date    CALCIUM 9.6 08/28/2023    PHOS 3.3 11/29/2022    PROT 6.7 06/03/2023    ALBUMIN 4.0 06/03/2023    AST 14 06/03/2023    ALT 11 06/03/2023    ALKPHOS 58 06/03/2023    BILITOT 0.5 06/03/2023       LIPID PANEL -   Lab Results    Component Value Date    CHOL 127 08/28/2023    TRIG 107 08/28/2023    HDL 41.3 08/28/2023    CHHDL 3.1 08/28/2023    LDLF 64 08/28/2023    VLDL 21 08/28/2023       RENAL FUNCTION PANEL -   Lab Results   Component Value Date    GLUCOSE 96 08/28/2023     08/28/2023    K 4.0 08/28/2023     08/28/2023    CO2 31 08/28/2023    ANIONGAP 10 08/28/2023    BUN 22 08/28/2023    CREATININE 0.95 08/28/2023    CALCIUM 9.6 08/28/2023    PHOS 3.3 11/29/2022    ALBUMIN 4.0 06/03/2023        Lab Results   Component Value Date    BNP 29 09/29/2020    HGBA1C 6.2 (A) 08/28/2023       Last Cardiology Tests:  10/03/2023 - Vascular Lab Carotid Artery Duplex  1. Right Carotid: Findings are consistent with less than 50% stenosis of the right proximal internal carotid artery. Laminar flow seen by color Doppler. Right external carotid artery appears patent with no evidence of stenosis. The right vertebral artery demonstrates retrograde flow which may be suggestive of a more proximal stenosis or occlusion. There is a >50% stenosis noted in the right subclavian artery.  2. Left Carotid: Findings are consistent with less than 50% stenosis of the left proximal internal carotid artery. Laminar flow seen by color Doppler. Left external carotid artery appears patent with no evidence of stenosis. The left vertebral artery is patent with antegrade flow. No evidence of hemodynamically significant stenosis in the left subclavian artery.    08/11/2023 - TTE  Left ventricular systolic function is normal with a 60-65% estimated ejection fraction.     06/22/2023 to 07/04/2023 - Holter Monitor  1. Predominant underlying rhythm was sinus rhythm; min HR 48 bpm, max  bpm, avg HR 67 bpm.  2. 3 supraventricular tachycardia runs occurred; fastest interval lasting 4 beats with max rate 136 bpm, longest lasting 6 beats with avg rage 108 bpm.     08/17/2020 - CT Chest  Emphysematous changes. Evidence of previous granulomatous infection. No acute  findings.     08/06/2020 - Stress Test  1. Normal stress myocardial perfusion imaging in response to pharmacologic stress. Well-maintained left ventricular function.  2. No clinical or electrocardiographic evidence for ischemia at maximal infusion. There was QT prolongation with Lexiscan.      08/06/2020 - TTE  1. The left ventricular systolic function is normal.  2. Aortic valve stenosis is not present.    Diagnostic review: I have personally reviewed the result(s) of the Carotid Duplex.     Assessment/Plan  1) Exertional CP and SOB  Negative Echocardiogram and stress test  SOB probably secondary to COPD  Seen by pulmonology 07/18/2023- CT scan ordered for November with 6-month followup arranged      2) HTN, Dizziness, Presyncope   2 month h/o of elevated blood pressures in the 160-180 range and dizziness with associated presyncope  Denies true syncope  Saw Dr. Alexandre, neurology, 06/01/2023, who ordered an MRI brain  MRI brain with parenchyma hyperintensities that may be s/t HTN  MRI of spine pending   CTA neck 12/01/2022 with occlusion of the right vertebral artery with partial reconstitution/collateralization  Labs 06/03/2023 with stable blood count, stable liver and kidney function, and lipids with an elevated LDL of 128  On lisinopril 10 mg once daily (started in 06/2023)  Holter monitoring with 3 runs of SVT  TTE 08/11/2023 with LVEF 60-65%   Reports only very occasional dizziness  Carotid duplex 10/03/2023 with less than 50% stenosis of bilateral proximal ICAs, 50% stenosis of the right subclavian artery  Reports LUE discomfort  Denies any RUE symptoms  Check CTA neck      Scribe Attestation  By signing my name below, I, Trevor Gtz   attest that this documentation has been prepared under the direction and in the presence of Pablo Walker MD.

## 2023-10-23 PROBLEM — R07.9 CHEST PAIN: Status: ACTIVE | Noted: 2023-10-23

## 2023-10-23 PROBLEM — R93.89 NEW ABNORMALITY ON CHEST X-RAY: Status: ACTIVE | Noted: 2023-10-23

## 2023-10-23 PROBLEM — R42 POSTURAL DIZZINESS WITH PRESYNCOPE: Status: ACTIVE | Noted: 2023-10-23

## 2023-10-23 PROBLEM — N39.46 MIXED STRESS AND URGE URINARY INCONTINENCE: Status: ACTIVE | Noted: 2023-10-23

## 2023-10-23 PROBLEM — R55 POSTURAL DIZZINESS WITH PRESYNCOPE: Status: ACTIVE | Noted: 2023-10-23

## 2023-10-23 PROBLEM — R10.2 SUPRAPUBIC PRESSURE: Status: ACTIVE | Noted: 2023-10-23

## 2023-10-23 PROBLEM — M47.812 SPONDYLOSIS OF CERVICAL REGION WITHOUT MYELOPATHY OR RADICULOPATHY: Status: ACTIVE | Noted: 2023-10-23

## 2023-10-23 PROBLEM — R06.00 DYSPNEA: Status: ACTIVE | Noted: 2023-10-23

## 2023-10-23 PROBLEM — R31.9 HEMATURIA: Status: ACTIVE | Noted: 2023-10-23

## 2023-10-23 PROBLEM — R27.0 ATAXIA: Status: ACTIVE | Noted: 2023-10-23

## 2023-10-23 PROBLEM — N32.89 BLADDER MASS: Status: ACTIVE | Noted: 2023-10-23

## 2023-10-23 PROBLEM — R09.89 CAROTID BRUIT: Status: ACTIVE | Noted: 2023-10-23

## 2023-10-23 PROBLEM — G56.00 CARPAL TUNNEL SYNDROME: Status: ACTIVE | Noted: 2023-10-23

## 2023-10-23 PROBLEM — G47.10 HYPERSOMNIA: Status: ACTIVE | Noted: 2023-10-23

## 2023-10-24 ENCOUNTER — APPOINTMENT (OUTPATIENT)
Dept: RADIOLOGY | Facility: HOSPITAL | Age: 73
End: 2023-10-24
Payer: MEDICARE

## 2023-10-25 ENCOUNTER — OFFICE VISIT (OUTPATIENT)
Dept: NEUROLOGY | Facility: HOSPITAL | Age: 73
End: 2023-10-25
Payer: MEDICARE

## 2023-10-25 VITALS — DIASTOLIC BLOOD PRESSURE: 79 MMHG | SYSTOLIC BLOOD PRESSURE: 157 MMHG | WEIGHT: 163.3 LBS | BODY MASS INDEX: 28.93 KG/M2

## 2023-10-25 DIAGNOSIS — G56.01 CARPAL TUNNEL SYNDROME OF RIGHT WRIST: ICD-10-CM

## 2023-10-25 DIAGNOSIS — R42 POSTURAL DIZZINESS WITH PRESYNCOPE: ICD-10-CM

## 2023-10-25 DIAGNOSIS — R27.0 ATAXIA: ICD-10-CM

## 2023-10-25 DIAGNOSIS — I65.01 VERTEBRAL ARTERY OCCLUSION, RIGHT: ICD-10-CM

## 2023-10-25 DIAGNOSIS — M47.812 CERVICAL SPONDYLOSIS WITHOUT MYELOPATHY: Primary | ICD-10-CM

## 2023-10-25 DIAGNOSIS — R55 POSTURAL DIZZINESS WITH PRESYNCOPE: ICD-10-CM

## 2023-10-25 DIAGNOSIS — R93.7 ABNORMAL MAGNETIC RESONANCE IMAGING OF CERVICAL SPINE: ICD-10-CM

## 2023-10-25 PROCEDURE — 1157F ADVNC CARE PLAN IN RCRD: CPT | Performed by: PSYCHIATRY & NEUROLOGY

## 2023-10-25 PROCEDURE — 1159F MED LIST DOCD IN RCRD: CPT | Performed by: PSYCHIATRY & NEUROLOGY

## 2023-10-25 PROCEDURE — 99214 OFFICE O/P EST MOD 30 MIN: CPT | Performed by: PSYCHIATRY & NEUROLOGY

## 2023-10-25 PROCEDURE — 1160F RVW MEDS BY RX/DR IN RCRD: CPT | Performed by: PSYCHIATRY & NEUROLOGY

## 2023-10-25 PROCEDURE — 3077F SYST BP >= 140 MM HG: CPT | Performed by: PSYCHIATRY & NEUROLOGY

## 2023-10-25 PROCEDURE — 3078F DIAST BP <80 MM HG: CPT | Performed by: PSYCHIATRY & NEUROLOGY

## 2023-10-25 PROCEDURE — 1125F AMNT PAIN NOTED PAIN PRSNT: CPT | Performed by: PSYCHIATRY & NEUROLOGY

## 2023-10-25 PROCEDURE — 1036F TOBACCO NON-USER: CPT | Performed by: PSYCHIATRY & NEUROLOGY

## 2023-10-25 ASSESSMENT — ENCOUNTER SYMPTOMS
LOSS OF SENSATION IN FEET: 1
OCCASIONAL FEELINGS OF UNSTEADINESS: 1
DEPRESSION: 0

## 2023-10-25 ASSESSMENT — PAIN SCALES - GENERAL: PAINLEVEL: 6

## 2023-10-25 NOTE — PATIENT INSTRUCTIONS
Plans:  Refer to spine surgeon for MRI cervical spine findings (cervical spine stenosis AND abnormality in dens)--Dr Barton ([place])  Discussed about moderate right carpal tunnel findings and referral to orthopedic surgeon, pt wants to defer  Advised will defer presyncopal episodes to cardiology, and if continues to have presyncopal episodes, strongly consider adjusting down dose of ropinirole c/o prescriber    Rtc as needed in neuro clinic

## 2023-10-25 NOTE — PROGRESS NOTES
"Follow-up visit    Visit type: Follow-up    PCP: Gloria Potter DO.    Subjective     Magda Paz is a 72 y.o. year old female here for follow-up. Last seen 7/31/23.     Patient is accompanied by: Other none .       HPI    I first saw 6/1/23 for \"syncope\". Known R vert artery occlusion, evaluated by [facility] surgery as recently as 11/2022, non-operative. No antiplatelet started. Known cervical disc disease, s/p ACDF C4-C5 and C5-C6 in 2017 for RUE weakness. MRI reported ? posterior cord signal abnormality. States one particular time, was sitting in car. Was getting lightheaded. Happened for few minutes. Another instance was at home, felt flushed, and that she felt she would pass out. Has been happening x about 1 mo. Nothing prior. Could happen either sitting or standing. Laying down helps with symptoms. Has happened 6-7x in the last month. Has had no LOC. No known heart problems. States has had stress test done. States eating and drinking well though sometimes get dehydrated. No known sz disorder. No vision problems. No speech problems. No dysphagia. When walking, sometimes feels walking sideways. Stumbled a few times--thinks normal for age. Not on ASA/blood thinner. No hx GI bleed. I see no contraindication to starting antiplatelet. On multiple meds. Per pt Dr Barrow started her on 2 inhalers and 2 pills as recently as 1 week ago. No other med changes. On metoprolol, ropinirole 3mg tid, trazodone, and Wellbutrin XL and citalopram--per pt, no recent dose changes. Has COPD. No prior stroke. Ex-smoker, not smoking now. No etoh. No street drug use/marijuana. I ordered labs, LDL was elevated, B12 350. MRI brain with non-specific SC WM disease bilat. MRI c-spine denied by insurance, still to do, and do EMG/NCT BLE and start atorvastatin and do PT for ataxia.    Since last visit, MRI c-spine done showed no cord signal abnormality, with new moderate spinal acanal stenosis at C3-C4 and C6-C7 due to degenerative " "changes, and new edematous signal located within the dens and anterior arch of C1, likely sequela of acute degen changes. EMG/NCT with mod R CTS. No large fiber neuropathy. No R cervical/lumbar radiculopathy.     Comes in today for follow-up.    States fell down stairs/hit side of head on railing 6 months ago. Has no neck pain, but has some ROM limitation in neck.     Patient Active Problem List   Diagnosis    Allergic rhinitis    Anxiety and depression    Depressive disorder    Atherosclerosis of aorta (CMS/HCC)    Benign essential hypertension    Neoplasm of bladder    Chronic hypoxemic respiratory failure (CMS/HCC)    Chronic obstructive pulmonary disease with (acute) exacerbation (CMS/HCC)    COPD (chronic obstructive pulmonary disease) (CMS/HCC)    GERD (gastroesophageal reflux disease)    Generalized osteoarthritis    Eczema    Hyperlipidemia    Hypothyroidism    Impaired fasting glucose    Insomnia    Migraines    Obstructive sleep apnea syndrome    Stage 3 chronic kidney disease (CMS/HCC)    Restless legs    Malignant neoplasm of posterior wall of urinary bladder (CMS/HCC)    Urothelial carcinoma of bladder (CMS/HCC)    Cervical stenosis of spinal canal    Vertebral artery occlusion, right    Ataxia    Bladder mass    Carotid bruit    Carpal tunnel syndrome    Chest pain    Dyspnea    Hematuria    Hypersomnia    Mixed stress and urge urinary incontinence    New abnormality on chest x-ray    Postural dizziness with presyncope    Cervical spondylosis without myelopathy    Suprapubic pressure    Abnormal magnetic resonance imaging of cervical spine       Allergies   Allergen Reactions    Metoclopramide Hcl Palpitations, Shortness of breath and Other     chest pain    Sulfasalazine Anaphylaxis and Other     severe leukopenia    \"almost ,attacked my blood, thraot closed, intubated\"    Meloxicam Rash     rash    Sulfa (Sulfonamide Antibiotics) Other     Severe leukopenia      Sulfamethoxazole Other "       Current Outpatient Medications:     albuterol 90 mcg/actuation inhaler, Inhale 1-2 puffs every 4 hours if needed. ProAir, Disp: , Rfl:     Anoro Ellipta 62.5-25 mcg/actuation blister with device, Inhale 1 puff once daily., Disp: , Rfl:     buPROPion XL (Wellbutrin XL) 300 mg 24 hr tablet, Take 1 tablet (300 mg) by mouth once daily., Disp: 90 tablet, Rfl: 1    calcium carbonate-vitamin D3 (Caltrate with Vitamin D3) 600 mg-20 mcg (800 unit) tablet, Take 1 tablet by mouth once daily., Disp: , Rfl:     citalopram (CeleXA) 10 mg tablet, Take 1 tablet (10 mg) by mouth once daily., Disp: 90 tablet, Rfl: 1    famotidine (Pepcid) 40 mg tablet, Take 1 tablet (40 mg) by mouth once daily at bedtime., Disp: , Rfl:     fluticasone (Flonase) 50 mcg/actuation nasal spray, Administer 1 spray into each nostril 2 times a day., Disp: , Rfl:     levothyroxine (Synthroid, Levoxyl) 50 mcg tablet, Take 1 tablet (50 mcg) by mouth once daily., Disp: 90 tablet, Rfl: 1    metoprolol tartrate (Lopressor) 50 mg tablet, Take 0.5 tablets by mouth 2 times a day., Disp: , Rfl:     montelukast (Singulair) 10 mg tablet, Take 1 tablet (10 mg) by mouth once daily at bedtime., Disp: , Rfl:     multivitamin (Daily Multi-Vitamin) tablet, Take 1 tablet by mouth once daily., Disp: , Rfl:     omeprazole (PriLOSEC) 20 mg DR capsule, Take 1 capsule (20 mg) by mouth once daily., Disp: 90 capsule, Rfl: 1    rizatriptan (Maxalt) 10 mg tablet, Take 1 tablet (10 mg) by mouth if needed., Disp: , Rfl:     rOPINIRole (Requip) 3 mg tablet, Take 1 tablet (3 mg) by mouth 3 times a day., Disp: , Rfl:     traZODone (Desyrel) 50 mg tablet, Take 1 tablet (50 mg) by mouth once daily at bedtime., Disp: 90 tablet, Rfl: 1     Objective     /79   Wt 74.1 kg (163 lb 4.8 oz)   BMI 28.93 kg/m²      Awake, alert, oriented x3, in no distress  Well-nourished, ambulatory independently     Mental status exam as above, conversant   Full EOMs intact, no nystagmus, no ptosis    No facial droop   Hearing grossly intact   No dysarthria     Motor strength is at least antigravity on all extremities  Finger to nose test intact bilaterally   (+) Romberg   Normal gait       MR cervical spine wo IV contrast 08/15/2023    Narrative  Interpreted By:  MAYRA PRYOR MD  MRN: [id number]  Patient Name: ANA MASCORRO    STUDY:  MRI CERVICAL WO; ;  8/15/2023 8:17 am    INDICATION:  ataxia  R27.0: Ataxia.    COMPARISON:  MRI cervical spine from 09/12/2017. CTA neck from 12/01/2022.    ACCESSION NUMBER(S):  [id number]    ORDERING CLINICIAN:  RAMY VENTURA    TECHNIQUE:  MRI of the cervical spine was performed with acquisition of sagittal  T2, sagittal T1, sagittal STIR, axial T1, and axial T2 weighted  sequences.    FINDINGS:  Evaluation is somewhat degraded due to patient motion.    There is normal alignment. There are postoperative changes from  anterior cervical discectomy and fusion at C4-C6. Vertebral body and  visualized intervertebral disc heights are maintained. Marrow signal  is within normal limits. There are chronic degenerative plate changes  at multiple levels including osteophyte formation.    There is STIR hyperintense signal located within the dens and the  anterior arch of C1 which may reflect degenerative osseous edema  versus reactive hypervascularity. Marrow signal is otherwise within  normal limits.    Cervical spinal cord is normal in signal and caliber.    At C2-C3, is no posterior disc contour abnormality. There is no  spinal canal stenosis or neural foraminal narrowing. There is mild  left facet osteoarthropathy.    At C3-C4, there is a disc osteophyte complex which indents the  ventral cord and causes overall moderate spinal canal stenosis, new  since the prior MRI. There is mild-to-moderate bilateral neural  foraminal narrowing due to uncovertebral hypertrophy. There is no  facet osteoarthropathy.    At C4-C5, there is no posterior disc contour abnormality. There is  resolution of  spinal canal stenosis seen on the prior MRI. There is  no spinal canal stenosis or neural foraminal narrowing. There is  moderate-to-marked left and moderate right neural foraminal narrowing  due to uncovertebral hypertrophy. There is no facet osteoarthropathy.    At C5-C6, there is resolution of spinal canal stenosis seen on prior  MRI status post anterior cervical discectomy and fusion. There is no  striking posterior disc contour abnormality. There is moderate  bilateral neural foraminal narrowing due to uncovertebral  hypertrophy. There is no striking facet osteoarthropathy.    At C6-C7, there is a disc osteophyte complex which along with  ligamentum flavum thickening causes moderate spinal canal stenosis,  new since the prior MRI. There is new moderate bilateral neural  foraminal narrowing due to uncovertebral hypertrophy. There is no  striking facet osteoarthropathy.    At C7-T1, there is no posterior disc contour abnormality. There is no  spinal canal stenosis or neural foraminal narrowing. There is no  facet osteoarthropathy.    Impression  1. Postoperative changes from anterior cervical discectomy and fusion  at C4-C6 with resolution of spinal canal stenosis at the levels of  C4-C5 and C5-C6 seen on the prior MRI.    2. New moderate spinal canal stenosis at C3-C4 and C6-C7 due to  degenerative changes. New moderate bilateral neural foraminal  narrowing at C6-C7 due degenerative changes.    3. New edematous signal located within the dens and anterior arch of  C1, likely sequela of acute degenerative changes.    4. Additional findings as above.    This study was interpreted at [facility].    MACRO:  None      Assessment/Plan   Problem List Items Addressed This Visit             ICD-10-CM    Vertebral artery occlusion, right I65.01     On ASA 81mg daily  No stroke in posterior circulation identified  LDL elevated         Ataxia R27.0     (+) Romberg  Hx describes ataxia as  well--incidental--NOT the reason for referral)  Non-diabetic, no alcohol  B12 ok  MRI brain with no structural reason for ataxia  MRI c-spine with some cervical canal stenosis, no cord signal abnormality (s/p ACDF C4-C5 and C5-C6 2017)  EMG/NCT with NO polyneuropathy  Discussed         Carpal tunnel syndrome G56.00     EMG/NCT done showed moderate R CTS         Postural dizziness with presyncope R42, R55     Defer to cardiology         Cervical spondylosis without myelopathy - Primary M47.812    Relevant Orders    Referral to Orthopaedic Surgery    Follow Up In Neurology    Abnormal magnetic resonance imaging of cervical spine R93.7     Abnormal finding in dens--? Etiology  Has had fall 6 mo ago  Discussed            Plans:  Refer to spine surgeon for MRI cervical spine findings (cervical spine stenosis AND abnormality in dens)--Dr Barton ([place])  Discussed about moderate right carpal tunnel findings and referral to orthopedic surgeon, pt wants to defer  Advised will defer presyncopal episodes to cardiology, and if continues to have presyncopal episodes, strongly consider adjusting down dose of ropinirole c/o prescriber    Rtc as needed in neuro clinic    All questions were answered.  Pt knows how to contact my office in case pt has any questions or concerns.    Rell Alexandre MD

## 2023-10-27 PROBLEM — R93.7 ABNORMAL MAGNETIC RESONANCE IMAGING OF CERVICAL SPINE: Status: ACTIVE | Noted: 2023-10-27

## 2023-10-27 NOTE — ASSESSMENT & PLAN NOTE
(+) Romberg  Hx describes ataxia as well--incidental--NOT the reason for referral)  Non-diabetic, no alcohol  B12 ok  MRI brain with no structural reason for ataxia  MRI c-spine with some cervical canal stenosis, no cord signal abnormality (s/p ACDF C4-C5 and C5-C6 2017)  EMG/NCT with NO polyneuropathy  Discussed

## 2023-11-07 ENCOUNTER — APPOINTMENT (OUTPATIENT)
Dept: CARDIOLOGY | Facility: CLINIC | Age: 73
End: 2023-11-07
Payer: MEDICARE

## 2023-11-21 ENCOUNTER — OFFICE VISIT (OUTPATIENT)
Dept: ORTHOPEDIC SURGERY | Facility: CLINIC | Age: 73
End: 2023-11-21
Payer: MEDICARE

## 2023-11-21 ENCOUNTER — APPOINTMENT (OUTPATIENT)
Dept: ORTHOPEDIC SURGERY | Facility: CLINIC | Age: 73
End: 2023-11-21
Payer: MEDICARE

## 2023-11-21 DIAGNOSIS — M48.02 CERVICAL SPINAL STENOSIS: Primary | ICD-10-CM

## 2023-11-21 DIAGNOSIS — R26.89 BALANCE PROBLEM: ICD-10-CM

## 2023-11-21 PROCEDURE — 1159F MED LIST DOCD IN RCRD: CPT | Performed by: ORTHOPAEDIC SURGERY

## 2023-11-21 PROCEDURE — 1036F TOBACCO NON-USER: CPT | Performed by: ORTHOPAEDIC SURGERY

## 2023-11-21 PROCEDURE — 99213 OFFICE O/P EST LOW 20 MIN: CPT | Performed by: ORTHOPAEDIC SURGERY

## 2023-11-21 PROCEDURE — 1125F AMNT PAIN NOTED PAIN PRSNT: CPT | Performed by: ORTHOPAEDIC SURGERY

## 2023-11-21 PROCEDURE — 99203 OFFICE O/P NEW LOW 30 MIN: CPT | Performed by: ORTHOPAEDIC SURGERY

## 2023-11-21 PROCEDURE — 1160F RVW MEDS BY RX/DR IN RCRD: CPT | Performed by: ORTHOPAEDIC SURGERY

## 2023-11-21 NOTE — PROGRESS NOTES
HPI:Magda Paz is a 73-year-old woman, who comes in today, with complaints of balance problems.  Her past medical history is significant for prior C4-6 ACDF performed by another surgeon.  She denies any upper extremity symptoms.  She has had some falls recently.  She has not done any specific balance training.      ROS:  Reviewed on EMR and patient intake sheet.    PMH/SH:   Reviewed on EMR and patient intake sheet.    Exam:  Physical Exam    Constitutional: Well appearing; no acute distress  Eyes: pupils are equal and round  Psych: normal affect  Respiratory: non-labored breathing  Cardiovascular: regular rate and rhythm  GI: non-distended abdomen  Musculoskeletal: no pain with range of motion of the shoulders bilaterally; no signs of impingement  Neurologic: [4]/5 strength in the upper extremities bilaterally]; [negative Carr's]; [no hyper-reflexia]; negative Spurling's    Radiology:  MRI was personally reviewed.  It demonstrates prior C4-6 ACDF.  She has some moderate junctional narrowing at C3-4 and C6-C7 without cord compression or cord signal change.    Diagnosis:  Balance problem; cervical stenosis    Assessment and Plan:   73-year-old woman, with some junctional cervical stenosis and complaints of a balance problem.  Her MRI does not demonstrate any severe cord compression.  I recommend some physical therapy with balance training.  Should her balance continue to worsen however, then surgical intervention for her junctional stenosis would be appropriate.  She will follow-up with me at that time.    The patient was in agreement with the plan. At the end of the visit today, the patient felt that all questions had been answered satisfactorily.  The patient was pleased with the visit and very appreciative for the care rendered.     Thank you very much for the kind referral.  It is a privilege, and a pleasure, to partner with you in the care of your patients.  I would be delighted to assist you with any  further consultations as needed.  Please feel free to have your patients refer to my website, [email or web address], for patient education materials regarding treatment options for common spinal conditions.        Kevin Barton MD    Chief of Spine Surgery, [facility]  Director of Spine Service, [facility]  , Department of Orthopaedics  [facility]  [address] [place] [address]  [email or web address]  P: [phone]    This note was dictated with voice recognition software.  It has not been proofread for grammatical errors, typographical mistakes or other semantic inconsistencies.

## 2023-11-29 DIAGNOSIS — G25.81 RESTLESS LEGS: Primary | ICD-10-CM

## 2023-11-30 RX ORDER — ROPINIROLE 3 MG/1
3 TABLET, FILM COATED ORAL 3 TIMES DAILY
Qty: 270 TABLET | Refills: 0 | Status: SHIPPED | OUTPATIENT
Start: 2023-11-30 | End: 2024-03-01 | Stop reason: SDUPTHER

## 2023-11-30 NOTE — TELEPHONE ENCOUNTER
Pharmacy Request  Med was sent in on 3/1/23 for 90 day and 1 refill  Pt would have been out a couple of months ago  Next OV 3/1/24  Please advise, AM

## 2023-12-01 ENCOUNTER — APPOINTMENT (OUTPATIENT)
Dept: VASCULAR SURGERY | Facility: CLINIC | Age: 73
End: 2023-12-01
Payer: MEDICARE

## 2023-12-12 ENCOUNTER — HOSPITAL ENCOUNTER (OUTPATIENT)
Dept: RADIOLOGY | Facility: HOSPITAL | Age: 73
Discharge: HOME | End: 2023-12-12
Payer: MEDICARE

## 2023-12-12 DIAGNOSIS — Z87.891 PERSONAL HISTORY OF NICOTINE DEPENDENCE: ICD-10-CM

## 2023-12-12 PROCEDURE — 71271 CT THORAX LUNG CANCER SCR C-: CPT

## 2023-12-12 PROCEDURE — 71271 CT THORAX LUNG CANCER SCR C-: CPT | Performed by: RADIOLOGY

## 2023-12-13 ENCOUNTER — TELEPHONE (OUTPATIENT)
Dept: PULMONOLOGY | Facility: HOSPITAL | Age: 73
End: 2023-12-13
Payer: MEDICARE

## 2023-12-13 NOTE — TELEPHONE ENCOUNTER
Patient acknowledged understanding.    ----- Message from CHELSEA Rico-CNP sent at 12/13/2023  9:39 AM EST -----  Please call the patient and let her know that there is a new area on her CT, it could be just an area of inflammation but we will get a follow up CT chest in 3 months for her. Dr. Barrow will order this at her follow up next month.

## 2024-01-29 ENCOUNTER — OFFICE VISIT (OUTPATIENT)
Dept: PULMONOLOGY | Facility: HOSPITAL | Age: 74
End: 2024-01-29
Payer: MEDICARE

## 2024-01-29 VITALS
RESPIRATION RATE: 16 BRPM | WEIGHT: 165 LBS | BODY MASS INDEX: 30.36 KG/M2 | OXYGEN SATURATION: 95 % | SYSTOLIC BLOOD PRESSURE: 123 MMHG | HEART RATE: 62 BPM | HEIGHT: 62 IN | TEMPERATURE: 97.2 F | DIASTOLIC BLOOD PRESSURE: 57 MMHG

## 2024-01-29 DIAGNOSIS — Z87.891 FORMER CIGARETTE SMOKER: ICD-10-CM

## 2024-01-29 DIAGNOSIS — G25.81 RESTLESS LEGS: ICD-10-CM

## 2024-01-29 DIAGNOSIS — J30.9 CHRONIC ALLERGIC RHINITIS: ICD-10-CM

## 2024-01-29 DIAGNOSIS — G47.33 OSA (OBSTRUCTIVE SLEEP APNEA): ICD-10-CM

## 2024-01-29 DIAGNOSIS — J44.9 CHRONIC OBSTRUCTIVE PULMONARY DISEASE, UNSPECIFIED COPD TYPE (MULTI): Primary | ICD-10-CM

## 2024-01-29 DIAGNOSIS — K21.9 GASTROESOPHAGEAL REFLUX DISEASE WITHOUT ESOPHAGITIS: ICD-10-CM

## 2024-01-29 PROCEDURE — 99214 OFFICE O/P EST MOD 30 MIN: CPT | Performed by: INTERNAL MEDICINE

## 2024-01-29 PROCEDURE — 3078F DIAST BP <80 MM HG: CPT | Performed by: INTERNAL MEDICINE

## 2024-01-29 PROCEDURE — 3074F SYST BP LT 130 MM HG: CPT | Performed by: INTERNAL MEDICINE

## 2024-01-29 PROCEDURE — 1157F ADVNC CARE PLAN IN RCRD: CPT | Performed by: INTERNAL MEDICINE

## 2024-01-29 PROCEDURE — 1125F AMNT PAIN NOTED PAIN PRSNT: CPT | Performed by: INTERNAL MEDICINE

## 2024-01-29 PROCEDURE — 1036F TOBACCO NON-USER: CPT | Performed by: INTERNAL MEDICINE

## 2024-01-29 PROCEDURE — 1159F MED LIST DOCD IN RCRD: CPT | Performed by: INTERNAL MEDICINE

## 2024-01-29 RX ORDER — UMECLIDINIUM BROMIDE AND VILANTEROL TRIFENATATE 62.5; 25 UG/1; UG/1
1 POWDER RESPIRATORY (INHALATION) DAILY
Qty: 3 EACH | Refills: 3 | Status: SHIPPED | OUTPATIENT
Start: 2024-01-29 | End: 2025-01-28

## 2024-01-29 ASSESSMENT — ENCOUNTER SYMPTOMS
COUGH: 1
SHORTNESS OF BREATH: 1

## 2024-01-29 NOTE — PATIENT INSTRUCTIONS
1. Continue Anoro one puff once a day. Continue albuterol as needed.   2. Continue Montelukast and Cetirizine as needed as discussed.  3. Continue on Flonase and Azelastine nasal spray as needed as discussed.  4. Continue on CPAP nightly.   5. Please get CT scan of your chest in Feb 2024.  6. Call me with any questions or concerns.  7. Follow up with Tonie ARAUJO in late February 2024 and Dr. Barrow in November 2024.

## 2024-01-29 NOTE — PROGRESS NOTES
Subjective   Patient ID: Magda Paz is a 73 y.o. female who presents for COPD (Patient is here for follow up. Patient states she gets shortness of breath on exertion. Patient states she has occasional dry and productive cough. Patient has rescue inhaler using 1x daily. Patient has cpap nightly. Patient has oxygen using when needed 2L. Patient is here for CT scan results. ).    HPI  Ms. Paz is referred by Dr. Walker for further evaluation. She reports she stopped smoking in 2015 but started smoking at age of 16, smoked 1 ppd x 48 years. 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 Dyspnea was discovered when she had anaphylaxis reaction needing intubation. She quit smoking in 2015 when she had colitis. She gets short of breath walking a few feet and daily chores. She has cough off and on and feels chest tightness. She also gets wheezing off and on. She was initially sent for cardiology work up and Echo showed normal EF and RVSP. She does admit to have been told to have emphysema and is now on nebulizer and proair MDI. She needs to use inhaler daily. She reports she has oxygen at house that she uses with severe shortness of breath. She lives with her son since her  passed away 4 years ago. She denies drinking alcohol. She also has significant EDS and fatigue. She wakes up unrefreshed and has trouble falling asleep also. She states she is still having hypersomnia and wakes up feeling unrefreshed. She did have her home sleep stud and noted to have moderate MIK with significant hypoxia.      Today she is here for follow up. She states that she has been doing good with Anoro daily and symptoms have been controlled. She did get Covid after [place] 2023 and has recovered now.  She states she is using the CPAP nightly and doing well with settings of APAP 5-15 cwp but would like to start using her daughter's CPAP while resting or napping when in the living room. States her  daughter had inspire and does not use CPAP anymore. She states that since she started on the nasal sprays this has helped her tolerate CPAP. Overall improvement in hypersomnia and fatigue. She states that her breathing has been stable as well. She denies any unintentional weight loss or change in appetite or hemoptysis.    Review of Systems   Respiratory:  Positive for cough and shortness of breath.    All other systems reviewed and are negative.      Objective   Physical Exam  Vitals and nursing note reviewed.   Constitutional:       Appearance: Normal appearance.   HENT:      Head: Normocephalic.      Nose: Nose normal.      Mouth/Throat:      Pharynx: Oropharynx is clear.   Eyes:      Extraocular Movements: Extraocular movements intact.      Conjunctiva/sclera: Conjunctivae normal.      Pupils: Pupils are equal, round, and reactive to light.   Cardiovascular:      Rate and Rhythm: Normal rate and regular rhythm.      Pulses: Normal pulses.      Heart sounds: Normal heart sounds.   Pulmonary:      Effort: Pulmonary effort is normal.      Breath sounds: Normal breath sounds.   Abdominal:      General: Bowel sounds are normal.      Palpations: Abdomen is soft.   Musculoskeletal:         General: Normal range of motion.      Cervical back: Normal range of motion.   Skin:     General: Skin is warm.   Neurological:      General: No focal deficit present.      Mental Status: She is alert and oriented to person, place, and time. Mental status is at baseline.   Psychiatric:         Mood and Affect: Mood normal.         Behavior: Behavior normal.         Assessment/Plan   1. COPD  2. Obstructive sleep apnea  3. Allergic rhinitis  4. GERD  5. Granulomatous lung disease/multiple lung nodules  6. Former smoker, quit in 2015  7. Insomnia  8. Anxiety      Plan:  Patient with moderate COPD (FEV1 75%) and states has not smoked since 2015.  She notes overall DRAKE is controlled. She still gets an occasional exacerbation a year.      1. Continue Anoro one puff once a day. Continue prn albuterol and nebulizer.      2. Continue on montelukast and Cetrizine 5 mg daily and take Flonase spray. Azelastine prn especially if not able to take cetirizine.      3. She was not able to tolerate CPAP at 10-15, We decreased to 5-15 last visit and she states for the past few weeks she has been tolerating well now with pressures and with adding nasal sprays. She brought in CPAP and I reviewed settings AHI is 0.5 and P95 is 13.6. Will continue with current settings as she is tolerating well and improvement with hypersomnia. Encouraged continued use and to notify us sooner if any issues tolerating. She will bring her daughter's CPAP to set up at APAP 5-15 cwp or CPAP 13 cwp because she would like to use that one in the living room and her own in the bedroom.      4. LDCT chest with small stable nodules in Nov 2022. CT chest Nov 2023 with new 7 mm RUL nodule and recommended 3 months CT chest. Ordered one for Feb 2024.  We discussed differential diagnosis of inflammatory versus malignant nodules.     5. She is following with PCP for anxiety/depression.      6. States has RLS and reports taking ropinirole 3 mg TID which is a high dose. I advised her that MIK Rx will help RLS too and should decrease the dose of ropinirole. She is also having syncope and near-syncope. She follows with neurology.     7. Her GERD is also not controlled. Recommend continue Omeprazole and Famotidine.     Overall we will continue with current regimen and ordered Anoro refills. I ordered  for Feb 2024. She will follow up with Tonie ARAUJO after the CT chest.  I discussed care treatment options depending on the CT chest results including PET scan if the nodules are larger versus repeat CT chest for surveillance if nodules are stable or annual low-dose CT chest if nodules are smaller.  She understands and will follow-up after the CT scan.

## 2024-02-04 DIAGNOSIS — E03.9 HYPOTHYROIDISM, UNSPECIFIED TYPE: ICD-10-CM

## 2024-02-06 RX ORDER — LEVOTHYROXINE SODIUM 50 UG/1
50 TABLET ORAL DAILY
Qty: 90 TABLET | Refills: 0 | OUTPATIENT
Start: 2024-02-06

## 2024-02-19 ENCOUNTER — HOSPITAL ENCOUNTER (OUTPATIENT)
Dept: RADIOLOGY | Facility: CLINIC | Age: 74
Discharge: HOME | End: 2024-02-19
Payer: MEDICARE

## 2024-02-19 DIAGNOSIS — Z87.891 FORMER CIGARETTE SMOKER: ICD-10-CM

## 2024-02-19 PROCEDURE — 72128 CT CHEST SPINE W/O DYE: CPT | Performed by: RADIOLOGY

## 2024-02-19 PROCEDURE — 71250 CT THORAX DX C-: CPT

## 2024-02-22 DIAGNOSIS — E03.9 HYPOTHYROIDISM, UNSPECIFIED TYPE: ICD-10-CM

## 2024-02-22 RX ORDER — LEVOTHYROXINE SODIUM 50 UG/1
50 TABLET ORAL DAILY
Qty: 90 TABLET | Refills: 0 | OUTPATIENT
Start: 2024-02-22

## 2024-02-26 ENCOUNTER — OFFICE VISIT (OUTPATIENT)
Dept: PULMONOLOGY | Facility: HOSPITAL | Age: 74
End: 2024-02-26
Payer: MEDICARE

## 2024-02-26 VITALS
SYSTOLIC BLOOD PRESSURE: 112 MMHG | WEIGHT: 165.2 LBS | RESPIRATION RATE: 16 BRPM | OXYGEN SATURATION: 93 % | HEART RATE: 52 BPM | HEIGHT: 62 IN | DIASTOLIC BLOOD PRESSURE: 59 MMHG | BODY MASS INDEX: 30.4 KG/M2 | TEMPERATURE: 97.8 F

## 2024-02-26 DIAGNOSIS — Z87.891 FORMER CIGARETTE SMOKER: ICD-10-CM

## 2024-02-26 DIAGNOSIS — J30.9 CHRONIC ALLERGIC RHINITIS: ICD-10-CM

## 2024-02-26 DIAGNOSIS — J44.9 CHRONIC OBSTRUCTIVE PULMONARY DISEASE, UNSPECIFIED COPD TYPE (MULTI): Primary | ICD-10-CM

## 2024-02-26 DIAGNOSIS — Z87.891 FORMER CIGARETTE SMOKER: Primary | ICD-10-CM

## 2024-02-26 DIAGNOSIS — G47.33 OSA (OBSTRUCTIVE SLEEP APNEA): ICD-10-CM

## 2024-02-26 PROCEDURE — 1157F ADVNC CARE PLAN IN RCRD: CPT | Performed by: NURSE PRACTITIONER

## 2024-02-26 PROCEDURE — 1125F AMNT PAIN NOTED PAIN PRSNT: CPT | Performed by: NURSE PRACTITIONER

## 2024-02-26 PROCEDURE — 99213 OFFICE O/P EST LOW 20 MIN: CPT | Performed by: NURSE PRACTITIONER

## 2024-02-26 PROCEDURE — 1036F TOBACCO NON-USER: CPT | Performed by: NURSE PRACTITIONER

## 2024-02-26 PROCEDURE — 3074F SYST BP LT 130 MM HG: CPT | Performed by: NURSE PRACTITIONER

## 2024-02-26 PROCEDURE — 3078F DIAST BP <80 MM HG: CPT | Performed by: NURSE PRACTITIONER

## 2024-02-26 PROCEDURE — 99213 OFFICE O/P EST LOW 20 MIN: CPT | Mod: ZK | Performed by: NURSE PRACTITIONER

## 2024-02-26 PROCEDURE — 1159F MED LIST DOCD IN RCRD: CPT | Performed by: NURSE PRACTITIONER

## 2024-02-26 ASSESSMENT — PATIENT HEALTH QUESTIONNAIRE - PHQ9
1. LITTLE INTEREST OR PLEASURE IN DOING THINGS: NOT AT ALL
SUM OF ALL RESPONSES TO PHQ9 QUESTIONS 1 AND 2: 0
2. FEELING DOWN, DEPRESSED OR HOPELESS: NOT AT ALL

## 2024-02-26 ASSESSMENT — ENCOUNTER SYMPTOMS
DEPRESSION: 0
OCCASIONAL FEELINGS OF UNSTEADINESS: 0
COUGH: 1
SHORTNESS OF BREATH: 1
LOSS OF SENSATION IN FEET: 0

## 2024-02-26 ASSESSMENT — COLUMBIA-SUICIDE SEVERITY RATING SCALE - C-SSRS
1. IN THE PAST MONTH, HAVE YOU WISHED YOU WERE DEAD OR WISHED YOU COULD GO TO SLEEP AND NOT WAKE UP?: NO
6. HAVE YOU EVER DONE ANYTHING, STARTED TO DO ANYTHING, OR PREPARED TO DO ANYTHING TO END YOUR LIFE?: NO
2. HAVE YOU ACTUALLY HAD ANY THOUGHTS OF KILLING YOURSELF?: NO

## 2024-02-26 NOTE — PROGRESS NOTES
Subjective   Patient ID: Magda Paz is a 73 y.o. female who presents for COPD follow up.     HPI  Ms. Paz is referred by Dr. Walker for further evaluation. She reports she stopped smoking in 2015 but started smoking at age of 16, smoked 1 ppd x 48 years. 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 Dyspnea was discovered when she had anaphylaxis reaction needing intubation. She quit smoking in 2015 when she had colitis. She gets short of breath walking a few feet and daily chores. She has cough off and on and feels chest tightness. She also gets wheezing off and on. She was initially sent for cardiology work up and Echo showed normal EF and RVSP. She does admit to have been told to have emphysema and is now on nebulizer and proair MDI. She needs to use inhaler daily. She reports she has oxygen at house that she uses with severe shortness of breath. She lives with her son since her  passed away 4 years ago. She denies drinking alcohol. She also has significant EDS and fatigue. She wakes up unrefreshed and has trouble falling asleep also. She states she is still having hypersomnia and wakes up feeling unrefreshed. She did have her home sleep stud and noted to have moderate MIK with significant hypoxia.      Today she is here for follow up. She states that she has been doing good with Anoro daily and symptoms  have been controlled. She did get Covid after Christmas 2023 and has recovered now.  She states she is using the CPAP nightly and doing well with settings of APAP 5-15 cwp but would like to start using her daughter's CPAP while resting or napping when in the living room. States her daughter had inspire and does not use CPAP anymore. She states that since she started on the nasal sprays this has helped her tolerate CPAP. Overall improvement in hypersomnia and fatigue. She states that her breathing has been stable as well. She denies any unintentional weight loss or change in appetite or hemoptysis.    Today she is here for follow up for CT results. She has no other concerns today.     Review of Systems   Respiratory:  Positive for cough and shortness of breath.    All other systems reviewed and are negative.      Objective   Physical Exam  Vitals and nursing note reviewed.   Constitutional:       Appearance: Normal appearance.   HENT:      Head: Normocephalic.      Nose: Nose normal.      Mouth/Throat:      Pharynx: Oropharynx is clear.   Eyes:      Extraocular Movements: Extraocular movements intact.      Conjunctiva/sclera: Conjunctivae normal.      Pupils: Pupils are equal, round, and reactive to light.   Cardiovascular:      Rate and Rhythm: Normal rate and regular rhythm.      Pulses: Normal pulses.      Heart sounds: Normal heart sounds.   Pulmonary:      Effort: Pulmonary effort is normal.      Breath sounds: Normal breath sounds.   Abdominal:      General: Bowel sounds are normal.      Palpations: Abdomen is soft.   Musculoskeletal:         General: Normal range of motion.      Cervical back: Normal range of motion.   Skin:     General: Skin is warm.   Neurological:      General: No focal deficit present.      Mental Status: She is alert and oriented to person, place, and time. Mental status is at baseline.   Psychiatric:         Mood and Affect: Mood normal.         Behavior: Behavior normal.          Assessment/Plan   1. COPD  2. Obstructive sleep apnea  3. Allergic rhinitis  4. GERD  5. Granulomatous lung disease/multiple lung nodules  6. Former smoker, quit in 2015  7. Insomnia  8. Anxiety      Plan:  Patient with moderate COPD (FEV1 75%) and states has not smoked since 2015.  She notes overall DRAKE is controlled. She still gets an occasional exacerbation a year.     1. Continue Anoro one puff once a day. Continue prn albuterol and nebulizer.      2. Continue on montelukast and Cetrizine 5 mg daily and take Flonase spray. Azelastine prn especially if not able to take cetirizine.      3. She was not able to tolerate CPAP at 10-15, We decreased to 5-15 last visit and she states for the past few weeks she has been tolerating well now with pressures and with adding nasal sprays. She brought in CPAP and I reviewed settings AHI is 0.5 and P95 is 13.6. Will continue with current settings as she is tolerating well and improvement with hypersomnia. Encouraged continued use and to notify us sooner if any issues tolerating. She will bring her daughter's CPAP to set up at APAP 5-15 cwp or CPAP 13 cwp because she would like to use that one in the living room and her own in the bedroom.      4. LDCT chest with small stable nodules in Nov 2022. CT chest Nov 2023 with new 7 mm RUL nodule and recommended 3 months CT chest. This was done 2/19 and shows nodule is stable we will repeat another CT chest to confirm stability in 6 months, this is ordered for August.      5. She is following with PCP for anxiety/depression.      6.  has RLS and reports taking ropinirole 3 mg TID which is a high dose. I advised her that MIK Rx will help RLS too and should decrease the dose of ropinirole. She is also having syncope and near-syncope. She follows with neurology.     7. Her GERD is also not controlled. Recommend continue Omeprazole and Famotidine.     Overall we will continue current regimen and obtain LDCT in 6 months. I  will move follow up with Dr. Barrow to be in September instead of November. I instructed patient to call sooner if needed.

## 2024-02-26 NOTE — PATIENT INSTRUCTIONS
1. Continue Anoro one puff once a day. Continue albuterol as needed.   2. Continue Montelukast and Cetirizine as needed as discussed.  3. Continue on Flonase and Azelastine nasal spray as needed as discussed.  4. Continue on CPAP nightly.   5. Please get CT scan of your chest in August.   6. Call me with any questions or concerns.  7. Follow up with Dr. Barrow in September.

## 2024-03-01 ENCOUNTER — OFFICE VISIT (OUTPATIENT)
Dept: PRIMARY CARE | Facility: CLINIC | Age: 74
End: 2024-03-01
Payer: MEDICARE

## 2024-03-01 ENCOUNTER — TELEPHONE (OUTPATIENT)
Dept: PRIMARY CARE | Facility: CLINIC | Age: 74
End: 2024-03-01

## 2024-03-01 VITALS
WEIGHT: 167 LBS | OXYGEN SATURATION: 97 % | HEART RATE: 58 BPM | SYSTOLIC BLOOD PRESSURE: 130 MMHG | DIASTOLIC BLOOD PRESSURE: 76 MMHG | TEMPERATURE: 97.8 F | BODY MASS INDEX: 30.54 KG/M2

## 2024-03-01 DIAGNOSIS — I65.01 VERTEBRAL ARTERY OCCLUSION, RIGHT: ICD-10-CM

## 2024-03-01 DIAGNOSIS — F32.A ANXIETY AND DEPRESSION: ICD-10-CM

## 2024-03-01 DIAGNOSIS — K21.9 GASTROESOPHAGEAL REFLUX DISEASE, UNSPECIFIED WHETHER ESOPHAGITIS PRESENT: ICD-10-CM

## 2024-03-01 DIAGNOSIS — I70.0 ATHEROSCLEROSIS OF AORTA (CMS-HCC): ICD-10-CM

## 2024-03-01 DIAGNOSIS — G47.00 INSOMNIA, UNSPECIFIED TYPE: ICD-10-CM

## 2024-03-01 DIAGNOSIS — M47.812 CERVICAL SPONDYLOSIS WITHOUT MYELOPATHY: ICD-10-CM

## 2024-03-01 DIAGNOSIS — N18.30 STAGE 3 CHRONIC KIDNEY DISEASE, UNSPECIFIED WHETHER STAGE 3A OR 3B CKD (MULTI): ICD-10-CM

## 2024-03-01 DIAGNOSIS — F41.9 ANXIETY AND DEPRESSION: ICD-10-CM

## 2024-03-01 DIAGNOSIS — G25.81 RESTLESS LEGS: ICD-10-CM

## 2024-03-01 DIAGNOSIS — Z00.00 ROUTINE GENERAL MEDICAL EXAMINATION AT A HEALTH CARE FACILITY: Primary | ICD-10-CM

## 2024-03-01 DIAGNOSIS — R73.01 IFG (IMPAIRED FASTING GLUCOSE): ICD-10-CM

## 2024-03-01 DIAGNOSIS — E03.9 HYPOTHYROIDISM, UNSPECIFIED TYPE: ICD-10-CM

## 2024-03-01 DIAGNOSIS — J44.1 CHRONIC OBSTRUCTIVE PULMONARY DISEASE WITH (ACUTE) EXACERBATION (MULTI): ICD-10-CM

## 2024-03-01 PROBLEM — G47.10 HYPERSOMNIA: Status: RESOLVED | Noted: 2023-10-23 | Resolved: 2024-03-01

## 2024-03-01 PROBLEM — Z85.51 HISTORY OF BLADDER CANCER: Status: ACTIVE | Noted: 2020-07-14

## 2024-03-01 PROBLEM — R31.9 HEMATURIA: Status: RESOLVED | Noted: 2023-10-23 | Resolved: 2024-03-01

## 2024-03-01 PROBLEM — J44.9 COPD (CHRONIC OBSTRUCTIVE PULMONARY DISEASE) (MULTI): Status: ACTIVE | Noted: 2020-07-14

## 2024-03-01 PROBLEM — R10.2 SUPRAPUBIC PRESSURE: Status: RESOLVED | Noted: 2023-10-23 | Resolved: 2024-03-01

## 2024-03-01 PROBLEM — R93.89 NEW ABNORMALITY ON CHEST X-RAY: Status: RESOLVED | Noted: 2023-10-23 | Resolved: 2024-03-01

## 2024-03-01 PROBLEM — D49.4 NEOPLASM OF BLADDER: Status: RESOLVED | Noted: 2021-02-09 | Resolved: 2024-03-01

## 2024-03-01 PROBLEM — N32.89 BLADDER MASS: Status: RESOLVED | Noted: 2023-10-23 | Resolved: 2024-03-01

## 2024-03-01 PROBLEM — J96.11 CHRONIC HYPOXEMIC RESPIRATORY FAILURE (MULTI): Status: RESOLVED | Noted: 2022-03-10 | Resolved: 2024-03-01

## 2024-03-01 PROBLEM — R06.00 DYSPNEA: Status: RESOLVED | Noted: 2023-10-23 | Resolved: 2024-03-01

## 2024-03-01 PROBLEM — R07.9 CHEST PAIN: Status: RESOLVED | Noted: 2023-10-23 | Resolved: 2024-03-01

## 2024-03-01 PROBLEM — C67.9 UROTHELIAL CARCINOMA OF BLADDER (MULTI): Status: RESOLVED | Noted: 2023-09-01 | Resolved: 2024-03-01

## 2024-03-01 PROBLEM — R93.7 ABNORMAL MAGNETIC RESONANCE IMAGING OF CERVICAL SPINE: Status: RESOLVED | Noted: 2023-10-27 | Resolved: 2024-03-01

## 2024-03-01 PROCEDURE — 99214 OFFICE O/P EST MOD 30 MIN: CPT | Performed by: FAMILY MEDICINE

## 2024-03-01 PROCEDURE — 1157F ADVNC CARE PLAN IN RCRD: CPT | Performed by: FAMILY MEDICINE

## 2024-03-01 PROCEDURE — 99497 ADVNCD CARE PLAN 30 MIN: CPT | Performed by: FAMILY MEDICINE

## 2024-03-01 PROCEDURE — 1036F TOBACCO NON-USER: CPT | Performed by: FAMILY MEDICINE

## 2024-03-01 PROCEDURE — 3078F DIAST BP <80 MM HG: CPT | Performed by: FAMILY MEDICINE

## 2024-03-01 PROCEDURE — 99397 PER PM REEVAL EST PAT 65+ YR: CPT | Performed by: FAMILY MEDICINE

## 2024-03-01 PROCEDURE — 1159F MED LIST DOCD IN RCRD: CPT | Performed by: FAMILY MEDICINE

## 2024-03-01 PROCEDURE — G0439 PPPS, SUBSEQ VISIT: HCPCS | Performed by: FAMILY MEDICINE

## 2024-03-01 PROCEDURE — 1125F AMNT PAIN NOTED PAIN PRSNT: CPT | Performed by: FAMILY MEDICINE

## 2024-03-01 PROCEDURE — 1160F RVW MEDS BY RX/DR IN RCRD: CPT | Performed by: FAMILY MEDICINE

## 2024-03-01 PROCEDURE — 3075F SYST BP GE 130 - 139MM HG: CPT | Performed by: FAMILY MEDICINE

## 2024-03-01 RX ORDER — TRAZODONE HYDROCHLORIDE 50 MG/1
50 TABLET ORAL NIGHTLY
Qty: 90 TABLET | Refills: 1 | Status: SHIPPED | OUTPATIENT
Start: 2024-03-01 | End: 2024-08-28

## 2024-03-01 RX ORDER — ALBUTEROL SULFATE 0.83 MG/ML
2.5 SOLUTION RESPIRATORY (INHALATION) AS NEEDED
Qty: 75 ML | Refills: 3 | Status: SHIPPED | OUTPATIENT
Start: 2024-03-01 | End: 2024-03-06

## 2024-03-01 RX ORDER — ROPINIROLE 3 MG/1
3 TABLET, FILM COATED ORAL 3 TIMES DAILY
Qty: 270 TABLET | Refills: 0 | Status: SHIPPED | OUTPATIENT
Start: 2024-03-01 | End: 2024-06-03

## 2024-03-01 RX ORDER — LISINOPRIL 10 MG/1
10 TABLET ORAL DAILY
COMMUNITY

## 2024-03-01 RX ORDER — CITALOPRAM 10 MG/1
10 TABLET ORAL DAILY
Qty: 90 TABLET | Refills: 1 | Status: SHIPPED | OUTPATIENT
Start: 2024-03-01 | End: 2024-08-28

## 2024-03-01 RX ORDER — OMEPRAZOLE 20 MG/1
20 CAPSULE, DELAYED RELEASE ORAL DAILY
Qty: 90 CAPSULE | Refills: 1 | Status: SHIPPED | OUTPATIENT
Start: 2024-03-01 | End: 2024-08-28

## 2024-03-01 RX ORDER — BUPROPION HYDROCHLORIDE 300 MG/1
300 TABLET ORAL DAILY
Qty: 90 TABLET | Refills: 1 | Status: SHIPPED | OUTPATIENT
Start: 2024-03-01 | End: 2024-08-28

## 2024-03-01 RX ORDER — ALBUTEROL SULFATE 0.83 MG/ML
2.5 SOLUTION RESPIRATORY (INHALATION) AS NEEDED
COMMUNITY
End: 2024-03-01 | Stop reason: SDUPTHER

## 2024-03-01 RX ORDER — LEVOTHYROXINE SODIUM 50 UG/1
50 TABLET ORAL DAILY
Qty: 90 TABLET | Refills: 1 | Status: SHIPPED | OUTPATIENT
Start: 2024-03-01 | End: 2024-08-28

## 2024-03-01 NOTE — TELEPHONE ENCOUNTER
Called pt and pt states that she has not gotten it done yet because it just got approved through insurance.  Pt states she just received notification this week and is going to call to reschedule test.

## 2024-03-01 NOTE — TELEPHONE ENCOUNTER
----- Message from Gloria Potter DO sent at 3/1/2024  1:32 PM EST -----  Hi, can you please call patient and ask her if she has gotten the CTA of neck that Dr. Walker ordered scheduled yet? Were there any problems with scheduling?

## 2024-03-01 NOTE — PATIENT INSTRUCTIONS
1. Routine general medical examination at a health care facility      2. Insomnia, unspecified type    - traZODone (Desyrel) 50 mg tablet; Take 1 tablet (50 mg) by mouth once daily at bedtime.  Dispense: 90 tablet; Refill: 1    3. Restless legs    - rOPINIRole (Requip) 3 mg tablet; Take 1 tablet (3 mg) by mouth 3 times a day.  Dispense: 270 tablet; Refill: 0    4. Vertebral artery occlusion, right  Patient needs to get the CTA of her neck done that was ordered by cardiology.  This could be one of her causes for her sense of instability    5. Gastroesophageal reflux disease, unspecified whether esophagitis present    - omeprazole (PriLOSEC) 20 mg DR capsule; Take 1 capsule (20 mg) by mouth once daily.  Dispense: 90 capsule; Refill: 1    6. Hypothyroidism, unspecified type    - levothyroxine (Synthroid, Levoxyl) 50 mcg tablet; Take 1 tablet (50 mcg) by mouth once daily.  Dispense: 90 tablet; Refill: 1  - Tsh With Reflex To Free T4 If Abnormal; Future  - Tsh With Reflex To Free T4 If Abnormal; Future    7. Anxiety and depression    - citalopram (CeleXA) 10 mg tablet; Take 1 tablet (10 mg) by mouth once daily.  Dispense: 90 tablet; Refill: 1  - buPROPion XL (Wellbutrin XL) 300 mg 24 hr tablet; Take 1 tablet (300 mg) by mouth once daily.  Dispense: 90 tablet; Refill: 1    8. Cervical spondylosis without myelopathy  Referred to pain management.  We did discuss the risk versus benefit for trickle intervention.  Patient will talk to surgeon during her next visit  - Referral to Pain Medicine; Future    9. IFG (impaired fasting glucose)  - Hemoglobin A1C; Future  - Basic Metabolic Panel; Future    10. Chronic obstructive pulmonary disease with (acute) exacerbation (CMS/HCC)  - albuterol 2.5 mg /3 mL (0.083 %) nebulizer solution; Take 3 mL (2.5 mg) by nebulization if needed for wheezing.  Dispense: 75 mL; Refill: 3    11. Atherosclerosis of aorta (CMS/HCC)  - Lipid Panel; Future    12. Stage 3 chronic kidney disease,  unspecified whether stage 3a or 3b CKD (CMS/HCC)    History of falls: Encourage patient to join [employer] to participate in pool/water exercises that can improve strength of lower extremities.  Did offer physical therapy however patient declines

## 2024-03-01 NOTE — Clinical Note
Hi, can you please call patient and ask her if she has gotten the CTA of neck that Dr. Walker ordered scheduled yet? Were there any problems with scheduling?

## 2024-03-01 NOTE — PROGRESS NOTES
Assessment/Plan    ASSESSMENT/PLAN:      Patient Instructions   1. Routine general medical examination at a health care facility      2. Insomnia, unspecified type    - traZODone (Desyrel) 50 mg tablet; Take 1 tablet (50 mg) by mouth once daily at bedtime.  Dispense: 90 tablet; Refill: 1    3. Restless legs    - rOPINIRole (Requip) 3 mg tablet; Take 1 tablet (3 mg) by mouth 3 times a day.  Dispense: 270 tablet; Refill: 0    4. Vertebral artery occlusion, right  Patient needs to get the CTA of her neck done that was ordered by cardiology.  This could be one of her causes for her sense of instability    5. Gastroesophageal reflux disease, unspecified whether esophagitis present    - omeprazole (PriLOSEC) 20 mg DR capsule; Take 1 capsule (20 mg) by mouth once daily.  Dispense: 90 capsule; Refill: 1    6. Hypothyroidism, unspecified type    - levothyroxine (Synthroid, Levoxyl) 50 mcg tablet; Take 1 tablet (50 mcg) by mouth once daily.  Dispense: 90 tablet; Refill: 1  - Tsh With Reflex To Free T4 If Abnormal; Future  - Tsh With Reflex To Free T4 If Abnormal; Future    7. Anxiety and depression    - citalopram (CeleXA) 10 mg tablet; Take 1 tablet (10 mg) by mouth once daily.  Dispense: 90 tablet; Refill: 1  - buPROPion XL (Wellbutrin XL) 300 mg 24 hr tablet; Take 1 tablet (300 mg) by mouth once daily.  Dispense: 90 tablet; Refill: 1    8. Cervical spondylosis without myelopathy  Referred to pain management.  We did discuss the risk versus benefit for trickle intervention.  Patient will talk to surgeon during her next visit  - Referral to Pain Medicine; Future    9. IFG (impaired fasting glucose)  - Hemoglobin A1C; Future  - Basic Metabolic Panel; Future    10. Chronic obstructive pulmonary disease with (acute) exacerbation (CMS/HCC)  - albuterol 2.5 mg /3 mL (0.083 %) nebulizer solution; Take 3 mL (2.5 mg) by nebulization if needed for wheezing.  Dispense: 75 mL; Refill: 3    11. Atherosclerosis of aorta (CMS/HCC)  -  Lipid Panel; Future    12. Stage 3 chronic kidney disease, unspecified whether stage 3a or 3b CKD (CMS/HCC)    History of falls: Encourage patient to join [employer] to participate in pool/water exercises that can improve strength of lower extremities.  Did offer physical therapy however patient declines           FUTURE DIRECTION:   [Need to get CTA neck scheduled ]    Subjective   SUBJECTIVE:     Reason for Visit: Magda Paz is an 73 y.o. female here for a Medicare Wellness visit.     MIK:  Controlled with CPAP setting at 5-15      HTN:   Patient has been experiencing dizziness and presyncope sxs blood pressure is 162 180 systolic  Decreased metoprolol tartrate 25mg BID  Continues to take lisinopril daily    HLD/Atherosclerosis of aorta:   Recently started on Lipitor 40 mg with significant improvement in lipid panel    Vertebral artery occlusion  Carotid ultrasound done 2023 with right internal carotid <50% stenosis, right vertebral artery + retrograde flow?  Proximal stenosis or occlusion and >50% stenosis noted in right subclavian artery  CTA neck was read by cardiology but has not been done    Hypothyroid:   Controlled with levothyroxine 50 mcg    GERD:   Controlled with omeprazole 20 mg    Migraines:   Controlled with Maxalt 10 mg    RLS:   Controlled with ropinirole 3 mg 3 times daily  Patient states that if she misses a dose her symptoms become worse    Asthma/COPD  Minus of breath has been controlled with Anoro 1 puff daily, albuterol inhaler and nebulizer as needed  Uses Singulair and cetirizine 5 mg daily  Uses Flonase as needed    Lung nodules  Has been getting yearly low-dose CT chest  2022 CTA with small stable nodules  2023 with new 7 mm RUL nodule,  2024 CT with stable subcentimeter parenchymal lung nodules, needs follow-up CT in 6 months      Depression:   Controlled with Wellbutrin 200 mg, Celexa 10 mg  - brother  3/1/22 from heart attack     Eczema:   Mometasone as  needed        Preventative: last colonoscopy 2022, repeat 5 yrs            Care Team:   Urology:    Pulmonology:    Colonoscopy:    Neurology:   Cardiology: Dr. Walker           Past Medical, Surgical, and Family History reviewed and updated in chart.    Reviewed all medications by prescribing practitioner or clinical pharmacist (such as prescriptions, OTCs, herbal therapies and supplements) and documented in the medical record.    Patient Care Team:  Gloria Potter DO as PCP - General  Gloria Potter DO as PCP - [employer] PCP     Review of Systems    Objective   OBJECTIVE:     Vitals:  /76   Pulse 58   Temp 36.6 °C (97.8 °F)   Wt 75.8 kg (167 lb)   SpO2 97%   BMI 30.54 kg/m²       Physical Exam  HENT:      Head: Normocephalic and atraumatic.      Nose: Nose normal.      Mouth/Throat:      Mouth: Mucous membranes are moist.   Eyes:      Pupils: Pupils are equal, round, and reactive to light.   Neck:      Vascular: Carotid bruit present.   Cardiovascular:      Rate and Rhythm: Normal rate and regular rhythm.      Pulses: Normal pulses.      Heart sounds: No murmur heard.  Pulmonary:      Effort: Pulmonary effort is normal.      Breath sounds: Wheezing present.   Abdominal:      Tenderness: There is no abdominal tenderness.   Musculoskeletal:         General: Normal range of motion.      Cervical back: Normal range of motion.   Skin:     General: Skin is warm and dry.   Neurological:      Mental Status: She is alert.   Psychiatric:         Mood and Affect: Mood normal.         I spent  16  minutes discussing Advance care planning including the explanation and discussion of advance directives. If patient does not have current up to date documents,  examples  and information provided on how to create both Living Will and Power of . Patient was encouraged to work on completing these documents.

## 2024-03-03 DIAGNOSIS — J30.9 CHRONIC ALLERGIC RHINITIS: Primary | ICD-10-CM

## 2024-03-03 DIAGNOSIS — J44.1 CHRONIC OBSTRUCTIVE PULMONARY DISEASE WITH (ACUTE) EXACERBATION (MULTI): ICD-10-CM

## 2024-03-04 RX ORDER — MONTELUKAST SODIUM 10 MG/1
10 TABLET ORAL NIGHTLY
Qty: 90 TABLET | Refills: 0 | Status: SHIPPED | OUTPATIENT
Start: 2024-03-04 | End: 2024-06-03 | Stop reason: SDUPTHER

## 2024-03-06 RX ORDER — ALBUTEROL SULFATE 0.83 MG/ML
2.5 SOLUTION RESPIRATORY (INHALATION) EVERY 4 HOURS PRN
Qty: 75 ML | Refills: 3 | Status: SHIPPED | OUTPATIENT
Start: 2024-03-06

## 2024-03-06 RX ORDER — ALBUTEROL SULFATE 0.83 MG/ML
2.5 SOLUTION RESPIRATORY (INHALATION) EVERY 4 HOURS PRN
Qty: 75 ML | Refills: 3 | Status: SHIPPED | OUTPATIENT
Start: 2024-03-06 | End: 2024-03-06 | Stop reason: ENTERED-IN-ERROR

## 2024-06-01 DIAGNOSIS — J30.9 CHRONIC ALLERGIC RHINITIS: ICD-10-CM

## 2024-06-01 DIAGNOSIS — G25.81 RESTLESS LEGS: ICD-10-CM

## 2024-06-01 DIAGNOSIS — K21.9 GASTROESOPHAGEAL REFLUX DISEASE WITHOUT ESOPHAGITIS: Primary | ICD-10-CM

## 2024-06-03 RX ORDER — FAMOTIDINE 40 MG/1
40 TABLET, FILM COATED ORAL NIGHTLY
Qty: 30 TABLET | Refills: 11 | Status: SHIPPED | OUTPATIENT
Start: 2024-06-03

## 2024-06-03 RX ORDER — MONTELUKAST SODIUM 10 MG/1
10 TABLET ORAL NIGHTLY
Qty: 90 TABLET | Refills: 0 | Status: SHIPPED | OUTPATIENT
Start: 2024-06-03

## 2024-06-03 RX ORDER — ROPINIROLE 3 MG/1
3 TABLET, FILM COATED ORAL 3 TIMES DAILY
Qty: 270 TABLET | Refills: 0 | Status: SHIPPED | OUTPATIENT
Start: 2024-06-03

## 2024-06-03 RX ORDER — MONTELUKAST SODIUM 10 MG/1
10 TABLET ORAL NIGHTLY
Qty: 90 TABLET | Refills: 3 | Status: SHIPPED | OUTPATIENT
Start: 2024-06-03

## 2024-06-21 DIAGNOSIS — I10 PRIMARY HYPERTENSION: Primary | ICD-10-CM

## 2024-06-21 RX ORDER — METOPROLOL TARTRATE 50 MG/1
25 TABLET ORAL 2 TIMES DAILY
Qty: 90 TABLET | Refills: 0 | Status: SHIPPED | OUTPATIENT
Start: 2024-06-21 | End: 2024-09-19

## 2024-06-21 NOTE — TELEPHONE ENCOUNTER
Pt called Rx line asking for a refill on pended medication  Med has not been sent since 9/23 no refills on file  Please advise, AM    Next OV 9/3/24

## 2024-07-12 ENCOUNTER — APPOINTMENT (OUTPATIENT)
Dept: UROLOGY | Facility: CLINIC | Age: 74
End: 2024-07-12
Payer: MEDICARE

## 2024-07-12 VITALS
HEIGHT: 63 IN | HEART RATE: 49 BPM | WEIGHT: 160 LBS | DIASTOLIC BLOOD PRESSURE: 60 MMHG | BODY MASS INDEX: 28.35 KG/M2 | SYSTOLIC BLOOD PRESSURE: 126 MMHG

## 2024-07-12 DIAGNOSIS — Z85.51 HISTORY OF BLADDER CANCER: ICD-10-CM

## 2024-07-12 LAB
POC APPEARANCE, URINE: CLEAR
POC BILIRUBIN, URINE: NEGATIVE
POC BLOOD, URINE: ABNORMAL
POC COLOR, URINE: YELLOW
POC GLUCOSE, URINE: NEGATIVE MG/DL
POC KETONES, URINE: NEGATIVE MG/DL
POC LEUKOCYTES, URINE: NEGATIVE
POC NITRITE,URINE: NEGATIVE
POC PH, URINE: 5.5 PH
POC PROTEIN, URINE: NEGATIVE MG/DL
POC SPECIFIC GRAVITY, URINE: 1.02
POC UROBILINOGEN, URINE: 0.2 EU/DL

## 2024-07-12 PROCEDURE — 81003 URINALYSIS AUTO W/O SCOPE: CPT | Performed by: UROLOGY

## 2024-07-12 PROCEDURE — 52000 CYSTOURETHROSCOPY: CPT | Performed by: UROLOGY

## 2024-07-12 RX ORDER — CIPROFLOXACIN 500 MG/1
500 TABLET ORAL ONCE
Status: COMPLETED | OUTPATIENT
Start: 2024-07-12 | End: 2024-07-12

## 2024-07-12 RX ORDER — LIDOCAINE HYDROCHLORIDE 20 MG/ML
1 JELLY TOPICAL ONCE
Status: COMPLETED | OUTPATIENT
Start: 2024-07-12 | End: 2024-07-12

## 2024-07-12 NOTE — PROGRESS NOTES
07/12/2024  Cystoscopy    A cystoscopy was performed under local without difficulty    Findings: Normal urethra, normal bladder no stone no tumor    We discussed bladder cancer, surveillance cystoscopy yearly  We discussed urinary incontinence status post of urethral sling  All the questions were answered, the patient expressed understanding and agreed to the plan.    Impression  Bladder cancer  Urinary incontinence    Plan  Cystoscopy in a year    Subjective   Patient ID: Magda Paz is a 73 y.o. female who presents for Bladder Cancer (Patient is here today for yearly cysto was Dr. Iraheta pt).  HPI    Review of Systems    Objective   Physical Exam    Assessment/Plan            Slava Rider MD 07/12/24 12:31 PM     5/29/2019 Albertina Hunter CNP      68 year old new patient referred from Dr. Cuellar due to hematuria. History of smoking. Per Dr. Cuellar's notes from 5/21/19, patient had a D&C with hysterectomy done 4/23/19 due to post menopausal bleeding. Patient has noticed blood in the toilet and on the toilet paper intermittently for the last 2 years and consistently for the last 3 months. She also notices blood on her undergarments when she is very active. Admits to suprapubic pressure, frequency, urgency and mixed urinary incontinence. Straight catheterized patient for 25 cc of clear yellow urine. UA positive for blood (+++). Discussed hematuria workup with cystoscopy and CT of the abdomen and pelvis with and without contrast in order to rule out carcinoma of the bladder. All the questions were answered, the patient expressed understanding and agreed to the plan.     Impression   Gross hematuria  Urinary frequency and urgency  Mixed urinary incontinence  Suprapubic pressure      Plan   Urine culture  Urine cytology   CT of the abdomen and pelvis with and without contrast  BUN and Cr   Cystoscopy       Surgery  8/19/2022 urethral sling  3/1/2022 cystoscopy bladder biopsy and fulguration Dr. Iraheta  7/5/2019  TURBT

## 2024-07-13 ENCOUNTER — HOSPITAL ENCOUNTER (OUTPATIENT)
Dept: RADIOLOGY | Facility: HOSPITAL | Age: 74
Discharge: HOME | End: 2024-07-13
Payer: MEDICARE

## 2024-07-13 DIAGNOSIS — Z87.891 FORMER CIGARETTE SMOKER: ICD-10-CM

## 2024-07-13 PROCEDURE — 71250 CT THORAX DX C-: CPT

## 2024-07-13 PROCEDURE — 71250 CT THORAX DX C-: CPT | Performed by: RADIOLOGY

## 2024-07-22 RX ORDER — CETIRIZINE HYDROCHLORIDE 5 MG/1
5 TABLET ORAL
COMMUNITY
Start: 2024-03-29 | End: 2024-08-19

## 2024-08-18 DIAGNOSIS — J30.9 CHRONIC ALLERGIC RHINITIS: ICD-10-CM

## 2024-08-18 DIAGNOSIS — J44.9 CHRONIC OBSTRUCTIVE PULMONARY DISEASE, UNSPECIFIED COPD TYPE (MULTI): Primary | ICD-10-CM

## 2024-08-19 RX ORDER — CETIRIZINE HYDROCHLORIDE 5 MG/1
5 TABLET ORAL
Qty: 90 TABLET | Refills: 3 | Status: SHIPPED | OUTPATIENT
Start: 2024-08-19

## 2024-08-26 DIAGNOSIS — G25.81 RESTLESS LEGS: ICD-10-CM

## 2024-08-26 RX ORDER — ROPINIROLE 3 MG/1
3 TABLET, FILM COATED ORAL 3 TIMES DAILY
Qty: 270 TABLET | Refills: 0 | OUTPATIENT
Start: 2024-08-26

## 2024-08-28 DIAGNOSIS — G25.81 RESTLESS LEGS: ICD-10-CM

## 2024-08-28 RX ORDER — ROPINIROLE 3 MG/1
3 TABLET, FILM COATED ORAL 3 TIMES DAILY
Qty: 270 TABLET | Refills: 0 | Status: SHIPPED | OUTPATIENT
Start: 2024-08-28

## 2024-08-30 ENCOUNTER — LAB (OUTPATIENT)
Dept: LAB | Facility: LAB | Age: 74
End: 2024-08-30
Payer: MEDICARE

## 2024-08-30 DIAGNOSIS — I10 BENIGN ESSENTIAL HYPERTENSION: ICD-10-CM

## 2024-08-30 DIAGNOSIS — R73.01 IMPAIRED FASTING GLUCOSE: ICD-10-CM

## 2024-08-30 DIAGNOSIS — E03.9 HYPOTHYROIDISM, UNSPECIFIED TYPE: ICD-10-CM

## 2024-08-30 DIAGNOSIS — R73.01 IFG (IMPAIRED FASTING GLUCOSE): ICD-10-CM

## 2024-08-30 DIAGNOSIS — I70.0 ATHEROSCLEROSIS OF AORTA (CMS-HCC): ICD-10-CM

## 2024-08-30 DIAGNOSIS — E78.2 MIXED HYPERLIPIDEMIA: ICD-10-CM

## 2024-08-30 LAB
ANION GAP SERPL CALC-SCNC: 8 MMOL/L (ref 10–20)
BUN SERPL-MCNC: 17 MG/DL (ref 6–23)
CALCIUM SERPL-MCNC: 8.6 MG/DL (ref 8.6–10.3)
CHLORIDE SERPL-SCNC: 103 MMOL/L (ref 98–107)
CHOLEST SERPL-MCNC: 178 MG/DL (ref 0–199)
CHOLESTEROL/HDL RATIO: 4.4
CO2 SERPL-SCNC: 32 MMOL/L (ref 21–32)
CREAT SERPL-MCNC: 1.02 MG/DL (ref 0.5–1.05)
EGFRCR SERPLBLD CKD-EPI 2021: 58 ML/MIN/1.73M*2
EST. AVERAGE GLUCOSE BLD GHB EST-MCNC: 137 MG/DL
GLUCOSE SERPL-MCNC: 100 MG/DL (ref 74–99)
HBA1C MFR BLD: 6.4 %
HDLC SERPL-MCNC: 40.5 MG/DL
LDLC SERPL CALC-MCNC: 116 MG/DL
NON HDL CHOLESTEROL: 138 MG/DL (ref 0–149)
POTASSIUM SERPL-SCNC: 4.4 MMOL/L (ref 3.5–5.3)
SODIUM SERPL-SCNC: 139 MMOL/L (ref 136–145)
TRIGL SERPL-MCNC: 109 MG/DL (ref 0–149)
TSH SERPL-ACNC: 1.84 MIU/L (ref 0.44–3.98)
VLDL: 22 MG/DL (ref 0–40)

## 2024-08-30 PROCEDURE — 83036 HEMOGLOBIN GLYCOSYLATED A1C: CPT

## 2024-08-30 PROCEDURE — 80048 BASIC METABOLIC PNL TOTAL CA: CPT

## 2024-08-30 PROCEDURE — 36415 COLL VENOUS BLD VENIPUNCTURE: CPT

## 2024-08-30 PROCEDURE — 80061 LIPID PANEL: CPT

## 2024-08-30 PROCEDURE — 84443 ASSAY THYROID STIM HORMONE: CPT

## 2024-09-03 ENCOUNTER — APPOINTMENT (OUTPATIENT)
Dept: PRIMARY CARE | Facility: CLINIC | Age: 74
End: 2024-09-03
Payer: MEDICARE

## 2024-09-03 ENCOUNTER — OFFICE VISIT (OUTPATIENT)
Dept: PRIMARY CARE | Facility: CLINIC | Age: 74
End: 2024-09-03
Payer: MEDICARE

## 2024-09-03 VITALS
HEART RATE: 62 BPM | WEIGHT: 164 LBS | DIASTOLIC BLOOD PRESSURE: 74 MMHG | TEMPERATURE: 97 F | SYSTOLIC BLOOD PRESSURE: 126 MMHG | OXYGEN SATURATION: 95 % | BODY MASS INDEX: 29.05 KG/M2

## 2024-09-03 DIAGNOSIS — E78.2 MIXED HYPERLIPIDEMIA: ICD-10-CM

## 2024-09-03 DIAGNOSIS — E78.9 LIPID DISORDER: ICD-10-CM

## 2024-09-03 DIAGNOSIS — R73.01 IFG (IMPAIRED FASTING GLUCOSE): Primary | ICD-10-CM

## 2024-09-03 DIAGNOSIS — E03.9 HYPOTHYROIDISM, UNSPECIFIED TYPE: ICD-10-CM

## 2024-09-03 DIAGNOSIS — G47.00 INSOMNIA, UNSPECIFIED TYPE: ICD-10-CM

## 2024-09-03 DIAGNOSIS — F32.A ANXIETY AND DEPRESSION: ICD-10-CM

## 2024-09-03 DIAGNOSIS — K21.9 GASTROESOPHAGEAL REFLUX DISEASE, UNSPECIFIED WHETHER ESOPHAGITIS PRESENT: ICD-10-CM

## 2024-09-03 DIAGNOSIS — F41.9 ANXIETY AND DEPRESSION: ICD-10-CM

## 2024-09-03 DIAGNOSIS — I10 PRIMARY HYPERTENSION: ICD-10-CM

## 2024-09-03 PROCEDURE — 1159F MED LIST DOCD IN RCRD: CPT | Performed by: FAMILY MEDICINE

## 2024-09-03 PROCEDURE — 1036F TOBACCO NON-USER: CPT | Performed by: FAMILY MEDICINE

## 2024-09-03 PROCEDURE — 99214 OFFICE O/P EST MOD 30 MIN: CPT | Performed by: FAMILY MEDICINE

## 2024-09-03 PROCEDURE — 3078F DIAST BP <80 MM HG: CPT | Performed by: FAMILY MEDICINE

## 2024-09-03 PROCEDURE — 3074F SYST BP LT 130 MM HG: CPT | Performed by: FAMILY MEDICINE

## 2024-09-03 PROCEDURE — 1157F ADVNC CARE PLAN IN RCRD: CPT | Performed by: FAMILY MEDICINE

## 2024-09-03 RX ORDER — BUPROPION HYDROCHLORIDE 300 MG/1
300 TABLET ORAL DAILY
Qty: 90 TABLET | Refills: 1 | Status: SHIPPED | OUTPATIENT
Start: 2024-09-03 | End: 2025-03-02

## 2024-09-03 RX ORDER — ASPIRIN 81 MG/1
81 TABLET ORAL DAILY
COMMUNITY

## 2024-09-03 RX ORDER — METOPROLOL TARTRATE 50 MG/1
25 TABLET ORAL 2 TIMES DAILY
Qty: 90 TABLET | Refills: 0 | Status: SHIPPED | OUTPATIENT
Start: 2024-09-03 | End: 2024-12-02

## 2024-09-03 RX ORDER — CITALOPRAM 10 MG/1
10 TABLET ORAL DAILY
Qty: 90 TABLET | Refills: 1 | Status: SHIPPED | OUTPATIENT
Start: 2024-09-03 | End: 2025-03-02

## 2024-09-03 RX ORDER — LEVOTHYROXINE SODIUM 50 UG/1
50 TABLET ORAL DAILY
Qty: 90 TABLET | Refills: 1 | Status: SHIPPED | OUTPATIENT
Start: 2024-09-03 | End: 2025-03-02

## 2024-09-03 RX ORDER — ATORVASTATIN CALCIUM 40 MG/1
40 TABLET, FILM COATED ORAL NIGHTLY
Qty: 90 TABLET | Refills: 1 | Status: SHIPPED | OUTPATIENT
Start: 2024-09-03 | End: 2025-03-02

## 2024-09-03 RX ORDER — TRAZODONE HYDROCHLORIDE 50 MG/1
50 TABLET ORAL NIGHTLY
Qty: 90 TABLET | Refills: 1 | Status: SHIPPED | OUTPATIENT
Start: 2024-09-03 | End: 2025-03-02

## 2024-09-03 RX ORDER — OMEPRAZOLE 20 MG/1
20 CAPSULE, DELAYED RELEASE ORAL DAILY
Qty: 90 CAPSULE | Refills: 1 | Status: SHIPPED | OUTPATIENT
Start: 2024-09-03 | End: 2025-03-02

## 2024-09-03 NOTE — PROGRESS NOTES
Assessment/Plan    ASSESSMENT/PLAN:      Refilled pts meds.   Follow up with Dr. Castrejon in 6 mo for AWE.  Monitor A1c and weight loss.    There are no Patient Instructions on file for this visit.      FUTURE DIRECTION:       Subjective   SUBJECTIVE:     Reason for Visit: Magda Paz is an 73 y.o. female here for a follow up visit.     MIK:  Controlled with CPAP setting at 5-15      HTN:   Well controlled most times.    HLD/Atherosclerosis of aorta:   Recently started on Lipitor 40 mg with significant improvement in lipid panel    Vertebral artery occlusion  Carotid ultrasound done 2023 with right internal carotid <50% stenosis, right vertebral artery + retrograde flow?  Proximal stenosis or occlusion and >50% stenosis noted in right subclavian artery    Hypothyroid:   Controlled with levothyroxine 50 mcg    GERD:   Controlled with omeprazole 20 mg    Migraines:   Controlled with Maxalt 10 mg    RLS:   Controlled with ropinirole 3 mg 3 times daily  Patient states that if she misses a dose her symptoms become worse    Asthma/COPD  Minus of breath has been controlled with Anoro 1 puff daily, albuterol inhaler and nebulizer as needed  Uses Singulair and cetirizine 5 mg daily  Uses Flonase as needed    Lung nodules  Has been getting yearly low-dose CT chest  2022 CTA with small stable nodules  2023 with new 7 mm RUL nodule,  2024 CT with stable subcentimeter parenchymal lung nodules, needs follow-up CT in 12 months      Depression:   Controlled with Wellbutrin 200 mg, Celexa 10 mg  - brother  3/1/22 from heart attack     Eczema:   Mometasone as needed        Preventative: last colonoscopy , repeat 5 yrs            Care Team:   Urology:    Pulmonology:    Colonoscopy:    Neurology:   Cardiology: Dr. Walker           Past Medical, Surgical, and Family History reviewed and updated in chart.    Reviewed all medications by prescribing practitioner or clinical  pharmacist (such as prescriptions, OTCs, herbal therapies and supplements) and documented in the medical record.    Patient Care Team:  Tj Piña MD as PCP - General (Family Medicine)  Gloria Potter DO as PCP - [employer] PCP     Review of Systems    Objective   OBJECTIVE:     Vitals:  /74   Pulse 62   Temp 36.1 °C (97 °F)   Wt 74.4 kg (164 lb)   SpO2 95%   BMI 29.05 kg/m²       Physical Exam  HENT:      Head: Normocephalic and atraumatic.      Nose: Nose normal.      Mouth/Throat:      Mouth: Mucous membranes are moist.   Eyes:      Pupils: Pupils are equal, round, and reactive to light.   Neck:      Vascular: Carotid bruit present.   Cardiovascular:      Rate and Rhythm: Normal rate and regular rhythm.      Pulses: Normal pulses.      Heart sounds: No murmur heard.  Pulmonary:      Effort: Pulmonary effort is normal.      Breath sounds: Wheezing present.   Abdominal:      Tenderness: There is no abdominal tenderness.   Musculoskeletal:         General: Normal range of motion.      Cervical back: Normal range of motion.   Skin:     General: Skin is warm and dry.   Neurological:      Mental Status: She is alert.   Psychiatric:         Mood and Affect: Mood normal.         HPI

## 2024-09-10 ENCOUNTER — APPOINTMENT (OUTPATIENT)
Dept: PULMONOLOGY | Facility: HOSPITAL | Age: 74
End: 2024-09-10
Payer: MEDICARE

## 2024-09-12 NOTE — PROGRESS NOTES
"Counseling:  The patient was counseled regarding diagnostic results, instructions for management, risk factor reductions, prognosis, patient and family education, impressions, risks and benefits of treatment options and importance of compliance with treatment.      Chief Complaint:   The patient presents today for annual followup of HTN.     History Of Present Illness:    Magda Bill is a 73 year old female patient who presents today for annual followup of HTN. Her PMH is significant for HTN, atherosclerosis of aorta, urothelial cancer of the bladder, COPD, GERD, hyperlipidemia, migraines, MIK, and CKD stage 3. Over the past year, the patient states that she has done relatively well from a cardiac standpoint. She reports occasional chest discomfort which is short lasting and not worrisome to her. She denies any SOB. She reports dizziness/lightheadedness, as well as occasional right arm discomfort. BP has been \"steady.\" EKG today shows NSR with no acute changes. The patient is compliant with her prescribed medications.      Last Recorded Vitals:  Vitals:    09/13/24 1125   BP: 120/60   Pulse: (!) 47   Weight: 69.9 kg (154 lb)   Height: 1.6 m (5' 3\")       Past Surgical History:  She has a past surgical history that includes Other surgical history (05/29/2019); Other surgical history (05/29/2019); Other surgical history (05/29/2019); and CT angio neck (12/1/2022).      Social History:  She reports that she quit smoking about 9 years ago. Her smoking use included cigarettes. She has never used smokeless tobacco. She reports that she does not currently use alcohol. She reports that she does not use drugs.    Family History:  Family History   Problem Relation Name Age of Onset    Cervical cancer Mother      Cancer Daughter          Allergies:  Metoclopramide hcl, Sulfasalazine, Sulfa (sulfonamide antibiotics), Meloxicam, and Sulfamethoxazole    Outpatient Medications:  Current Outpatient Medications   Medication " Instructions    albuterol 90 mcg/actuation inhaler 1-2 puffs, inhalation, Every 4 hours PRN, ProAir    albuterol 2.5 mg, nebulization, Every 4 hours PRN    Anoro Ellipta 62.5-25 mcg/actuation blister with device 1 puff, inhalation, Daily    aspirin 81 mg, oral, Daily    atorvastatin (LIPITOR) 40 mg, oral, Nightly    buPROPion XL (WELLBUTRIN XL) 300 mg, oral, Daily    calcium carbonate-vitamin D3 (Caltrate with Vitamin D3) 600 mg-20 mcg (800 unit) tablet 1 tablet, oral, Daily    cetirizine (ZYRTEC) 5 mg, oral, Daily before breakfast    citalopram (CELEXA) 10 mg, oral, Daily    famotidine (PEPCID) 40 mg, oral, Nightly    fluticasone (Flonase) 50 mcg/actuation nasal spray 1 spray, Each Nostril, 2 times daily    levothyroxine (SYNTHROID, LEVOXYL) 50 mcg, oral, Daily    lisinopril 10 mg, oral, Daily    metoprolol tartrate (LOPRESSOR) 25 mg, oral, 2 times daily    montelukast (SINGULAIR) 10 mg, oral, Nightly    montelukast (SINGULAIR) 10 mg, oral, Nightly    multivitamin (Daily Multi-Vitamin) tablet 1 tablet, oral, Daily    omeprazole (PRILOSEC) 20 mg, oral, Daily    rizatriptan (MAXALT) 10 mg, oral, As needed    rOPINIRole (REQUIP) 3 mg, oral, 3 times daily    traZODone (DESYREL) 50 mg, oral, Nightly     Review of Systems   Cardiovascular:         Occasional chest discomfort   Musculoskeletal:         Occasional right arm discomfort   Neurological:  Positive for dizziness and light-headedness.   All other systems reviewed and are negative.     Physical Exam:  Constitutional:       Appearance: Healthy appearance. Not in distress.   Neck:      Vascular: No JVR. JVD normal.   Pulmonary:      Effort: Pulmonary effort is normal.      Breath sounds: Normal breath sounds. No wheezing. No rhonchi. No rales.   Chest:      Chest wall: Not tender to palpatation.   Cardiovascular:      PMI at left midclavicular line. Normal rate. Regular rhythm. Normal S1. Normal S2.       Murmurs: There is no murmur.      No gallop.  No click. No  rub.   Pulses:     Intact distal pulses.   Edema:     Peripheral edema absent.   Abdominal:      General: Bowel sounds are normal.      Palpations: Abdomen is soft.      Tenderness: There is no abdominal tenderness.   Musculoskeletal: Normal range of motion.         General: No tenderness. Skin:     General: Skin is warm and dry.   Neurological:      General: No focal deficit present.      Mental Status: Alert and oriented to person, place and time.        Last Labs:  CBC -  Lab Results   Component Value Date    WBC 7.9 06/03/2023    HGB 14.6 06/03/2023    HCT 45.4 06/03/2023    MCV 90 06/03/2023     06/03/2023       CMP -  Lab Results   Component Value Date    CALCIUM 8.6 08/30/2024    PHOS 3.3 11/29/2022    PROT 6.7 06/03/2023    ALBUMIN 4.0 06/03/2023    AST 14 06/03/2023    ALT 11 06/03/2023    ALKPHOS 58 06/03/2023    BILITOT 0.5 06/03/2023       LIPID PANEL -   Lab Results   Component Value Date    CHOL 178 08/30/2024    TRIG 109 08/30/2024    HDL 40.5 08/30/2024    CHHDL 4.4 08/30/2024    LDLF 64 08/28/2023    VLDL 22 08/30/2024    NHDL 138 08/30/2024       RENAL FUNCTION PANEL -   Lab Results   Component Value Date    GLUCOSE 100 (H) 08/30/2024     08/30/2024    K 4.4 08/30/2024     08/30/2024    CO2 32 08/30/2024    ANIONGAP 8 (L) 08/30/2024    BUN 17 08/30/2024    CREATININE 1.02 08/30/2024    CALCIUM 8.6 08/30/2024    PHOS 3.3 11/29/2022    ALBUMIN 4.0 06/03/2023        Lab Results   Component Value Date    BNP 29 09/29/2020    HGBA1C 6.4 (H) 08/30/2024       Last Cardiology Tests:  10/03/2023 - Vascular Lab Carotid Artery Duplex  1. Right Carotid: Findings are consistent with less than 50% stenosis of the right proximal internal carotid artery. Laminar flow seen by color Doppler. Right external carotid artery appears patent with no evidence of stenosis. The right vertebral artery demonstrates retrograde flow which may be suggestive of a more proximal stenosis or occlusion. There is a  >50% stenosis noted in the right subclavian artery.  2. Left Carotid: Findings are consistent with less than 50% stenosis of the left proximal internal carotid artery. Laminar flow seen by color Doppler. Left external carotid artery appears patent with no evidence of stenosis. The left vertebral artery is patent with antegrade flow. No evidence of hemodynamically significant stenosis in the left subclavian artery.    08/11/2023 - TTE  Left ventricular systolic function is normal with a 60-65% estimated ejection fraction.     06/22/2023 to 07/04/2023 - Holter Monitor  1. Predominant underlying rhythm was sinus rhythm; min HR 48 bpm, max  bpm, avg HR 67 bpm.  2. 3 supraventricular tachycardia runs occurred; fastest interval lasting 4 beats with max rate 136 bpm, longest lasting 6 beats with avg rage 108 bpm.     08/17/2020 - CT Chest  Emphysematous changes. Evidence of previous granulomatous infection. No acute findings.     08/06/2020 - Stress Test  1. Normal stress myocardial perfusion imaging in response to pharmacologic stress. Well-maintained left ventricular function.  2. No clinical or electrocardiographic evidence for ischemia at maximal infusion. There was QT prolongation with Lexiscan.      08/06/2020 - TTE  1. The left ventricular systolic function is normal.  2. Aortic valve stenosis is not present.    Lab review: I have personally reviewed the laboratory result(s).     Assessment/Plan   1) Exertional CP and SOB  Negative Echocardiogram and stress test  SOB probably secondary to COPD  Seen by pulmonology 07/18/2023- CT scan ordered for November with 6-month followup arranged   Reports occasional chest discomfort - short lasting and not worrisome to patient   Denies SOB  F/U 1 year      2) HTN, Dizziness, Presyncope   On lisinopril 10 mg once daily, metoprolol tartrate 50 mg BID  H/O elevated blood pressures in the 160-180 range and dizziness with associated presyncope  Denies true syncope  Saw   "[facility], neurology, 06/01/2023, who ordered an MRI brain  MRI brain with parenchyma hyperintensities that may be s/t HTN  CTA neck 12/01/2022 with occlusion of the right vertebral artery with partial reconstitution/collateralization  Labs 06/03/2023 with stable blood count, stable liver and kidney function, and lipids with an elevated LDL of 128  Holter monitoring with 3 runs of SVT  TTE 08/11/2023 with LVEF 60-65%   Carotid duplex 10/03/2023 with less than 50% stenosis of bilateral proximal ICAs, 50% stenosis of the right subclavian artery.  Check CTA neck - denied by insurance   Reports dizziness/lightheadedness  BP has been \"steady\", per patient  Discontinue metoprolol s/t symptomatic bradycardia   Continue lisinopril as prescribed  Home monitoring of BP - to contact our office if BP elevates above 140/90 while off metoprolol   F/U 1 year     3) Carotid Stenosis, Right Subclavian Stenosis  Carotid duplex 10/03/2023 with less than 50% stenosis of bilateral proximal ICAs, 50% stenosis of the right subclavian artery  Check CTA neck - denied by insurance  Reports occasional right arm discomfort  Check carotid duplex - call with results  F/U 1 year     4) Hyperlipidemia  Management per PCP  On atorvastatin 40 mg daily   Lipid panel 08/30/2024 with LDL of 116; increased from 64  Per the patient, she had been off her atorvastatin prior to her labs, but has since restarted  PCP has ordered a followup lipid panel       Scribe Attestation  By signing my name below, I, Trevor Gtz   attest that this documentation has been prepared under the direction and in the presence of Pablo Walker MD.  "

## 2024-09-13 ENCOUNTER — OFFICE VISIT (OUTPATIENT)
Dept: CARDIOLOGY | Facility: HOSPITAL | Age: 74
End: 2024-09-13
Payer: MEDICARE

## 2024-09-13 VITALS
HEIGHT: 63 IN | WEIGHT: 154 LBS | HEART RATE: 47 BPM | BODY MASS INDEX: 27.29 KG/M2 | SYSTOLIC BLOOD PRESSURE: 120 MMHG | DIASTOLIC BLOOD PRESSURE: 60 MMHG

## 2024-09-13 DIAGNOSIS — R09.89 CAROTID BRUIT: ICD-10-CM

## 2024-09-13 DIAGNOSIS — I70.0 ATHEROSCLEROSIS OF AORTA (CMS-HCC): Primary | ICD-10-CM

## 2024-09-13 DIAGNOSIS — I10 BENIGN ESSENTIAL HYPERTENSION: ICD-10-CM

## 2024-09-13 PROCEDURE — 1036F TOBACCO NON-USER: CPT | Performed by: INTERNAL MEDICINE

## 2024-09-13 PROCEDURE — 93005 ELECTROCARDIOGRAM TRACING: CPT | Performed by: INTERNAL MEDICINE

## 2024-09-13 PROCEDURE — 93010 ELECTROCARDIOGRAM REPORT: CPT | Performed by: INTERNAL MEDICINE

## 2024-09-13 PROCEDURE — 1159F MED LIST DOCD IN RCRD: CPT | Performed by: INTERNAL MEDICINE

## 2024-09-13 PROCEDURE — 3008F BODY MASS INDEX DOCD: CPT | Performed by: INTERNAL MEDICINE

## 2024-09-13 PROCEDURE — 3078F DIAST BP <80 MM HG: CPT | Performed by: INTERNAL MEDICINE

## 2024-09-13 PROCEDURE — 99213 OFFICE O/P EST LOW 20 MIN: CPT | Performed by: INTERNAL MEDICINE

## 2024-09-13 PROCEDURE — 1157F ADVNC CARE PLAN IN RCRD: CPT | Performed by: INTERNAL MEDICINE

## 2024-09-13 PROCEDURE — 1160F RVW MEDS BY RX/DR IN RCRD: CPT | Performed by: INTERNAL MEDICINE

## 2024-09-13 PROCEDURE — 3074F SYST BP LT 130 MM HG: CPT | Performed by: INTERNAL MEDICINE

## 2024-09-13 ASSESSMENT — ENCOUNTER SYMPTOMS
OCCASIONAL FEELINGS OF UNSTEADINESS: 1
LIGHT-HEADEDNESS: 1
DIZZINESS: 1
DEPRESSION: 0
LOSS OF SENSATION IN FEET: 0

## 2024-09-13 NOTE — PATIENT INSTRUCTIONS
Discontinue metoprolol.  Continue all other medications as prescribed.   Please monitor your blood pressure at home. If your blood pressure starts to elevated above 140/90, please contact our office.  Dr. Walker has ordered an ultrasound of your carotid arteries. You will be notified of the results once they become available.    Followup with Dr. Walker in 1 year, sooner should any issues or concerns arise before then.     If you have any questions or cardiac concerns, please call our office at [phone].

## 2024-09-26 ENCOUNTER — OFFICE VISIT (OUTPATIENT)
Dept: PULMONOLOGY | Facility: HOSPITAL | Age: 74
End: 2024-09-26
Payer: MEDICARE

## 2024-09-26 VITALS
WEIGHT: 164.1 LBS | SYSTOLIC BLOOD PRESSURE: 152 MMHG | HEIGHT: 63 IN | HEART RATE: 62 BPM | OXYGEN SATURATION: 94 % | DIASTOLIC BLOOD PRESSURE: 74 MMHG | RESPIRATION RATE: 16 BRPM | BODY MASS INDEX: 29.07 KG/M2

## 2024-09-26 DIAGNOSIS — J44.9 CHRONIC OBSTRUCTIVE PULMONARY DISEASE, UNSPECIFIED COPD TYPE (MULTI): Primary | ICD-10-CM

## 2024-09-26 DIAGNOSIS — K21.9 GASTROESOPHAGEAL REFLUX DISEASE WITHOUT ESOPHAGITIS: ICD-10-CM

## 2024-09-26 DIAGNOSIS — Z87.891 FORMER CIGARETTE SMOKER: ICD-10-CM

## 2024-09-26 DIAGNOSIS — J30.9 CHRONIC ALLERGIC RHINITIS: ICD-10-CM

## 2024-09-26 DIAGNOSIS — G47.33 OSA (OBSTRUCTIVE SLEEP APNEA): ICD-10-CM

## 2024-09-26 DIAGNOSIS — G25.81 RESTLESS LEGS: ICD-10-CM

## 2024-09-26 PROCEDURE — 3077F SYST BP >= 140 MM HG: CPT | Performed by: INTERNAL MEDICINE

## 2024-09-26 PROCEDURE — 3008F BODY MASS INDEX DOCD: CPT | Performed by: INTERNAL MEDICINE

## 2024-09-26 PROCEDURE — 3078F DIAST BP <80 MM HG: CPT | Performed by: INTERNAL MEDICINE

## 2024-09-26 PROCEDURE — 99214 OFFICE O/P EST MOD 30 MIN: CPT | Performed by: INTERNAL MEDICINE

## 2024-09-26 PROCEDURE — 1157F ADVNC CARE PLAN IN RCRD: CPT | Performed by: INTERNAL MEDICINE

## 2024-09-26 PROCEDURE — 1159F MED LIST DOCD IN RCRD: CPT | Performed by: INTERNAL MEDICINE

## 2024-09-26 PROCEDURE — 1036F TOBACCO NON-USER: CPT | Performed by: INTERNAL MEDICINE

## 2024-09-26 RX ORDER — AZELASTINE 1 MG/ML
1 SPRAY, METERED NASAL 2 TIMES DAILY
COMMUNITY

## 2024-09-26 ASSESSMENT — ENCOUNTER SYMPTOMS
COUGH: 1
SHORTNESS OF BREATH: 1

## 2024-09-26 NOTE — PATIENT INSTRUCTIONS
1. Continue Anoro one puff once a day. Continue albuterol as needed.   2. Continue Montelukast and Cetirizine as needed as discussed.  3. Continue on Flonase and Azelastine nasal spray as needed as discussed.  4. Continue on CPAP nightly. Our office will obtain data from your DME [facility].  5. Please get CT scan of your chest on July 14, 2025.   6. Call me with any questions or concerns.  7. Follow up with Dr. Barrow in Late July 2025 or early August 2025.

## 2024-09-26 NOTE — PROGRESS NOTES
Subjective   Patient ID: Magda Paz is a 73 y.o. female who presents for COPD follow up.     HPI  Ms. Paz is referred by Dr. Walker for further evaluation. She reports she stopped smoking in 2015 but started smoking at age of 16, smoked 1 ppd x 48 years. 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 Dyspnea was discovered when she had anaphylaxis reaction needing intubation. She quit smoking in 2015 when she had colitis. She gets short of breath walking a few feet and daily chores. She has cough off and on and feels chest tightness. She also gets wheezing off and on. She was initially sent for cardiology work up and Echo showed normal EF and RVSP. She does admit to have been told to have emphysema and is now on nebulizer and proair MDI. She needs to use inhaler daily. She reports she has oxygen at house that she uses with severe shortness of breath. She lives with her son since her  passed away 4 years ago. She denies drinking alcohol. She also has significant EDS and fatigue. She wakes up unrefreshed and has trouble falling asleep also. She states she is still having hypersomnia and wakes up feeling unrefreshed. She did have her home sleep study and noted to have moderate MIK with significant hypoxia. She did get Covid after Christmas 2023 and recovered.       Today she is here for follow up. She states that  she has been doing good with Anoro daily and symptoms have been controlled. She denies any worsening of breathing or coughing. She states she was using the CPAP nightly APAP 5-15 cwp. States her daughter had inspire and does not use CPAP anymore & she will get hers. She states that since she started on the nasal sprays this has helped her tolerate CPAP. Overall improvement in hypersomnia and fatigue. She states that her breathing has been stable as well. She denies any unintentional weight loss or change in appetite or hemoptysis. Her LDCT chest in July 2024 showed stable lung nodules and calcified hilaar and parenchymal granulomas. She states she has Inogen that she bought on her own. She had tanks O2 that she got from [facility] and returned. She states she is using Inogen with activity keith when it is hot and humid and started on her own Inogen 2 L with APAP. She states she stopped using APAP in July and August because of facial injury and now is able to use APAP only when she is bleeding O2. She states she is motivated to continue to use CPAP nightly.        Review of Systems   Respiratory:  Positive for cough and shortness of breath.    All other systems reviewed and are negative.      Objective   Physical Exam  Vitals and nursing note reviewed.   Constitutional:       Appearance: Normal appearance.   HENT:      Head: Normocephalic.      Nose: Nose normal.      Mouth/Throat:      Pharynx: Oropharynx is clear.   Eyes:      Extraocular Movements: Extraocular movements intact.      Conjunctiva/sclera: Conjunctivae normal.      Pupils: Pupils are equal, round, and reactive to light.   Cardiovascular:      Rate and Rhythm: Normal rate and regular rhythm.      Pulses: Normal pulses.      Heart sounds: Normal heart sounds.   Pulmonary:      Effort: Pulmonary effort is normal.      Breath sounds: Normal breath sounds.   Abdominal:      General: Bowel sounds are normal.      Palpations: Abdomen is soft.    Musculoskeletal:         General: Normal range of motion.      Cervical back: Normal range of motion.   Skin:     General: Skin is warm.   Neurological:      General: No focal deficit present.      Mental Status: She is alert and oriented to person, place, and time. Mental status is at baseline.   Psychiatric:         Mood and Affect: Mood normal.         Behavior: Behavior normal.       Assessment/Plan   1. COPD  2. Obstructive sleep apnea  3. Allergic rhinitis  4. GERD  5. Granulomatous lung disease/multiple lung nodules  6. Former smoker, quit in 2015  7. Insomnia  8. Anxiety      Plan:  Patient with moderate COPD (FEV1 75%) and states has not smoked since 2015.  She notes overall DRAKE is controlled. She still gets an occasional exacerbation a year.     1. Continue Anoro one puff once a day. Continue prn albuterol and nebulizer.      2. Continue on montelukast and Cetrizine 5 mg daily and take Flonase spray. Azelastine prn especially if not able to take cetirizine.      3. She was not able to tolerate CPAP at 10-15, We decreased to 5-15 last visit and she states for the past few weeks she has been tolerating well now with pressures and with adding nasal sprays. She brought in CPAP and I reviewed settings AHI is 0.5 and P95 is 13.6. Will continue with current settings as she is tolerating well and improvement with hypersomnia. Encouraged continued use and to notify us sooner if any issues tolerating. She is bleeding O2 in her APAP on her own and states it helps her to use APAP better and tolerate better.      4. LDCT chest with small stable nodules in Nov 2022. CT chest Nov 2023 with new 7 mm RUL nodule and stable on 2/19. Repeat recommended per radiology in 6 months stable in July 2024 and noted RUL nodule is now 6 mm and other nodules are stable. Recommended LDCT chest on July 14, 25 through 2029.       A shared decision making was done regarding the importance of Low Dose CT chest in screening for lung  nodules. Discussed patient's risk factors for malignancy and qualifications for screening test. Discussed the follow up steps if nodules are found based on RAD guidelines. Patient understands and would proceed with Lung Cancer Screening CT chest protocol.      5. She is following with PCP for anxiety/depression.      6. States has RLS and reports taking ropinirole 3 mg TID which is a high dose. I advised her that MIK Rx will help RLS too and should decrease the dose of ropinirole. She is also having syncope and near-syncope. She follows with neurology. 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     7. Her GERD is also not controlled. Recommend continue Omeprazole and Famotidine.     Recommend LDCT chest in mid July 2025. Continue Anoro daily. Continue APAP and will obtain data from DME. Follow up in late July 2025. I instructed patient to call sooner if needed.

## 2024-10-09 ENCOUNTER — HOSPITAL ENCOUNTER (OUTPATIENT)
Dept: VASCULAR MEDICINE | Facility: HOSPITAL | Age: 74
Discharge: HOME | End: 2024-10-09
Payer: MEDICARE

## 2024-10-09 DIAGNOSIS — I70.0 ATHEROSCLEROSIS OF AORTA (CMS-HCC): ICD-10-CM

## 2024-10-09 DIAGNOSIS — I65.23 OCCLUSION AND STENOSIS OF BILATERAL CAROTID ARTERIES: ICD-10-CM

## 2024-10-09 DIAGNOSIS — I10 BENIGN ESSENTIAL HYPERTENSION: ICD-10-CM

## 2024-10-09 DIAGNOSIS — R09.89 CAROTID BRUIT: ICD-10-CM

## 2024-10-09 PROCEDURE — 93880 EXTRACRANIAL BILAT STUDY: CPT | Performed by: INTERNAL MEDICINE

## 2024-10-09 PROCEDURE — 93880 EXTRACRANIAL BILAT STUDY: CPT

## 2024-10-11 ENCOUNTER — TELEPHONE (OUTPATIENT)
Dept: CARDIOLOGY | Facility: HOSPITAL | Age: 74
End: 2024-10-11
Payer: MEDICARE

## 2024-10-11 DIAGNOSIS — R09.89 CAROTID BRUIT, UNSPECIFIED LATERALITY: Primary | ICD-10-CM

## 2024-10-11 NOTE — TELEPHONE ENCOUNTER
10/11 2:25pm - Patient called to request results from carotid artery duplex completed 10/9. Routing to nursing pool for further assistance.

## 2024-10-14 NOTE — TELEPHONE ENCOUNTER
RN called pt at this time regarding results and plan. Pt verbalized understanding and RN placed a repeat Carotid US for 2 years.

## 2024-11-25 ENCOUNTER — APPOINTMENT (OUTPATIENT)
Dept: PULMONOLOGY | Facility: HOSPITAL | Age: 74
End: 2024-11-25
Payer: MEDICARE

## 2024-11-30 DIAGNOSIS — G25.81 RESTLESS LEGS: ICD-10-CM

## 2024-12-02 RX ORDER — ROPINIROLE 3 MG/1
3 TABLET, FILM COATED ORAL 3 TIMES DAILY
Qty: 270 TABLET | Refills: 1 | Status: SHIPPED | OUTPATIENT
Start: 2024-12-02

## 2024-12-04 DIAGNOSIS — I10 BENIGN ESSENTIAL HYPERTENSION: Primary | ICD-10-CM

## 2024-12-04 RX ORDER — LISINOPRIL 10 MG/1
10 TABLET ORAL DAILY
Qty: 90 TABLET | Refills: 2 | Status: SHIPPED | OUTPATIENT
Start: 2024-12-04 | End: 2025-08-31

## 2025-01-31 DIAGNOSIS — J30.9 CHRONIC ALLERGIC RHINITIS: Primary | ICD-10-CM

## 2025-01-31 RX ORDER — AZELASTINE 1 MG/ML
1 SPRAY, METERED NASAL 2 TIMES DAILY
Qty: 30 ML | Refills: 3 | Status: SHIPPED | OUTPATIENT
Start: 2025-01-31

## 2025-01-31 RX ORDER — FLUTICASONE PROPIONATE 50 MCG
1 SPRAY, SUSPENSION (ML) NASAL 2 TIMES DAILY
Qty: 16 G | Refills: 3 | Status: SHIPPED | OUTPATIENT
Start: 2025-01-31

## 2025-03-01 DIAGNOSIS — E78.2 MIXED HYPERLIPIDEMIA: ICD-10-CM

## 2025-03-03 RX ORDER — ATORVASTATIN CALCIUM 40 MG/1
40 TABLET, FILM COATED ORAL NIGHTLY
Qty: 90 TABLET | Refills: 0 | Status: SHIPPED | OUTPATIENT
Start: 2025-03-03

## 2025-03-11 LAB
ALBUMIN SERPL-MCNC: 4.2 G/DL (ref 3.6–5.1)
ALP SERPL-CCNC: 76 U/L (ref 37–153)
ALT SERPL-CCNC: 19 U/L (ref 6–29)
ANION GAP SERPL CALCULATED.4IONS-SCNC: 7 MMOL/L (CALC) (ref 7–17)
AST SERPL-CCNC: 19 U/L (ref 10–35)
BILIRUB SERPL-MCNC: 0.3 MG/DL (ref 0.2–1.2)
BUN SERPL-MCNC: 29 MG/DL (ref 7–25)
CALCIUM SERPL-MCNC: 9 MG/DL (ref 8.6–10.4)
CHLORIDE SERPL-SCNC: 102 MMOL/L (ref 98–110)
CHOLEST SERPL-MCNC: 126 MG/DL
CHOLEST/HDLC SERPL: 2.8 (CALC)
CO2 SERPL-SCNC: 30 MMOL/L (ref 20–32)
CREAT SERPL-MCNC: 1.21 MG/DL (ref 0.6–1)
EGFRCR SERPLBLD CKD-EPI 2021: 47 ML/MIN/1.73M2
EST. AVERAGE GLUCOSE BLD GHB EST-MCNC: 131 MG/DL
EST. AVERAGE GLUCOSE BLD GHB EST-SCNC: 7.3 MMOL/L
GLUCOSE SERPL-MCNC: 111 MG/DL (ref 65–99)
HBA1C MFR BLD: 6.2 % OF TOTAL HGB
HDLC SERPL-MCNC: 45 MG/DL
LDLC SERPL CALC-MCNC: 68 MG/DL (CALC)
NONHDLC SERPL-MCNC: 81 MG/DL (CALC)
POTASSIUM SERPL-SCNC: 4.5 MMOL/L (ref 3.5–5.3)
PROT SERPL-MCNC: 6.8 G/DL (ref 6.1–8.1)
SODIUM SERPL-SCNC: 139 MMOL/L (ref 135–146)
TRIGL SERPL-MCNC: 57 MG/DL

## 2025-03-13 ENCOUNTER — OFFICE VISIT (OUTPATIENT)
Dept: UROLOGY | Facility: CLINIC | Age: 75
End: 2025-03-13
Payer: MEDICARE

## 2025-03-13 ENCOUNTER — APPOINTMENT (OUTPATIENT)
Dept: PRIMARY CARE | Facility: CLINIC | Age: 75
End: 2025-03-13
Payer: MEDICARE

## 2025-03-13 VITALS
DIASTOLIC BLOOD PRESSURE: 78 MMHG | BODY MASS INDEX: 30.24 KG/M2 | HEART RATE: 64 BPM | OXYGEN SATURATION: 92 % | SYSTOLIC BLOOD PRESSURE: 122 MMHG | TEMPERATURE: 97 F | WEIGHT: 168 LBS

## 2025-03-13 VITALS — DIASTOLIC BLOOD PRESSURE: 63 MMHG | HEART RATE: 73 BPM | SYSTOLIC BLOOD PRESSURE: 146 MMHG

## 2025-03-13 DIAGNOSIS — G47.00 INSOMNIA, UNSPECIFIED TYPE: ICD-10-CM

## 2025-03-13 DIAGNOSIS — Z85.51 HISTORY OF BLADDER CANCER: ICD-10-CM

## 2025-03-13 DIAGNOSIS — R10.32 LEFT LOWER QUADRANT ABDOMINAL PAIN: Primary | ICD-10-CM

## 2025-03-13 DIAGNOSIS — J44.1 CHRONIC OBSTRUCTIVE PULMONARY DISEASE WITH (ACUTE) EXACERBATION (MULTI): ICD-10-CM

## 2025-03-13 DIAGNOSIS — G25.81 RESTLESS LEGS: ICD-10-CM

## 2025-03-13 DIAGNOSIS — F32.A ANXIETY AND DEPRESSION: ICD-10-CM

## 2025-03-13 DIAGNOSIS — F33.9 MAJOR DEPRESSION, RECURRENT, CHRONIC (CMS-HCC): ICD-10-CM

## 2025-03-13 DIAGNOSIS — F41.9 ANXIETY AND DEPRESSION: ICD-10-CM

## 2025-03-13 DIAGNOSIS — R19.09 GROIN LUMP: ICD-10-CM

## 2025-03-13 DIAGNOSIS — R73.01 IMPAIRED FASTING GLUCOSE: ICD-10-CM

## 2025-03-13 DIAGNOSIS — E03.9 HYPOTHYROIDISM, UNSPECIFIED TYPE: ICD-10-CM

## 2025-03-13 DIAGNOSIS — J30.9 CHRONIC ALLERGIC RHINITIS: ICD-10-CM

## 2025-03-13 DIAGNOSIS — J06.9 URI, ACUTE: ICD-10-CM

## 2025-03-13 DIAGNOSIS — E78.2 MIXED HYPERLIPIDEMIA: ICD-10-CM

## 2025-03-13 DIAGNOSIS — K21.9 GASTROESOPHAGEAL REFLUX DISEASE, UNSPECIFIED WHETHER ESOPHAGITIS PRESENT: ICD-10-CM

## 2025-03-13 DIAGNOSIS — Z12.31 ENCOUNTER FOR SCREENING MAMMOGRAM FOR MALIGNANT NEOPLASM OF BREAST: Primary | ICD-10-CM

## 2025-03-13 DIAGNOSIS — N18.31 STAGE 3A CHRONIC KIDNEY DISEASE (MULTI): ICD-10-CM

## 2025-03-13 LAB
POC BILIRUBIN, URINE: NEGATIVE
POC BLOOD, URINE: ABNORMAL
POC GLUCOSE, URINE: NEGATIVE MG/DL
POC KETONES, URINE: NEGATIVE MG/DL
POC LEUKOCYTES, URINE: NEGATIVE
POC NITRITE,URINE: NEGATIVE
POC PH, URINE: 6.5 PH
POC PROTEIN, URINE: NEGATIVE MG/DL
POC SPECIFIC GRAVITY, URINE: 1.01
POC UROBILINOGEN, URINE: 0.2 EU/DL

## 2025-03-13 PROCEDURE — G2211 COMPLEX E/M VISIT ADD ON: HCPCS | Performed by: FAMILY MEDICINE

## 2025-03-13 PROCEDURE — 1157F ADVNC CARE PLAN IN RCRD: CPT | Performed by: UROLOGY

## 2025-03-13 PROCEDURE — 3078F DIAST BP <80 MM HG: CPT | Performed by: FAMILY MEDICINE

## 2025-03-13 PROCEDURE — 3077F SYST BP >= 140 MM HG: CPT | Performed by: UROLOGY

## 2025-03-13 PROCEDURE — 1157F ADVNC CARE PLAN IN RCRD: CPT | Performed by: FAMILY MEDICINE

## 2025-03-13 PROCEDURE — 3074F SYST BP LT 130 MM HG: CPT | Performed by: FAMILY MEDICINE

## 2025-03-13 PROCEDURE — 1159F MED LIST DOCD IN RCRD: CPT | Performed by: UROLOGY

## 2025-03-13 PROCEDURE — 99214 OFFICE O/P EST MOD 30 MIN: CPT | Performed by: UROLOGY

## 2025-03-13 PROCEDURE — 1160F RVW MEDS BY RX/DR IN RCRD: CPT | Performed by: FAMILY MEDICINE

## 2025-03-13 PROCEDURE — 99214 OFFICE O/P EST MOD 30 MIN: CPT | Performed by: FAMILY MEDICINE

## 2025-03-13 PROCEDURE — 81003 URINALYSIS AUTO W/O SCOPE: CPT | Performed by: UROLOGY

## 2025-03-13 PROCEDURE — 1036F TOBACCO NON-USER: CPT | Performed by: UROLOGY

## 2025-03-13 PROCEDURE — 1159F MED LIST DOCD IN RCRD: CPT | Performed by: FAMILY MEDICINE

## 2025-03-13 PROCEDURE — 3078F DIAST BP <80 MM HG: CPT | Performed by: UROLOGY

## 2025-03-13 PROCEDURE — 1036F TOBACCO NON-USER: CPT | Performed by: FAMILY MEDICINE

## 2025-03-13 RX ORDER — ROPINIROLE 3 MG/1
3 TABLET, FILM COATED ORAL 3 TIMES DAILY
Qty: 270 TABLET | Refills: 1 | Status: SHIPPED | OUTPATIENT
Start: 2025-03-13

## 2025-03-13 RX ORDER — TRAZODONE HYDROCHLORIDE 50 MG/1
50 TABLET ORAL NIGHTLY
Qty: 90 TABLET | Refills: 1 | Status: SHIPPED | OUTPATIENT
Start: 2025-03-13 | End: 2025-09-09

## 2025-03-13 RX ORDER — MONTELUKAST SODIUM 10 MG/1
10 TABLET ORAL NIGHTLY
Qty: 90 TABLET | Refills: 1 | Status: SHIPPED | OUTPATIENT
Start: 2025-03-13

## 2025-03-13 RX ORDER — BENZONATATE 100 MG/1
100 CAPSULE ORAL 3 TIMES DAILY PRN
Qty: 42 CAPSULE | Refills: 0 | Status: SHIPPED | OUTPATIENT
Start: 2025-03-13 | End: 2025-04-12

## 2025-03-13 RX ORDER — BUPROPION HYDROCHLORIDE 300 MG/1
300 TABLET ORAL DAILY
Qty: 90 TABLET | Refills: 1 | Status: SHIPPED | OUTPATIENT
Start: 2025-03-13 | End: 2025-09-09

## 2025-03-13 RX ORDER — LEVOTHYROXINE SODIUM 50 UG/1
50 TABLET ORAL DAILY
Qty: 90 TABLET | Refills: 1 | Status: SHIPPED | OUTPATIENT
Start: 2025-03-13 | End: 2025-09-09

## 2025-03-13 RX ORDER — OMEPRAZOLE 20 MG/1
20 CAPSULE, DELAYED RELEASE ORAL DAILY
Qty: 90 CAPSULE | Refills: 1 | Status: SHIPPED | OUTPATIENT
Start: 2025-03-13 | End: 2025-09-09

## 2025-03-13 RX ORDER — ATORVASTATIN CALCIUM 40 MG/1
40 TABLET, FILM COATED ORAL NIGHTLY
Qty: 90 TABLET | Refills: 1 | Status: SHIPPED | OUTPATIENT
Start: 2025-03-13

## 2025-03-13 RX ORDER — CITALOPRAM 10 MG/1
10 TABLET ORAL DAILY
Qty: 90 TABLET | Refills: 1 | Status: SHIPPED | OUTPATIENT
Start: 2025-03-13 | End: 2025-09-09

## 2025-03-13 ASSESSMENT — PATIENT HEALTH QUESTIONNAIRE - PHQ9
2. FEELING DOWN, DEPRESSED OR HOPELESS: NOT AT ALL
1. LITTLE INTEREST OR PLEASURE IN DOING THINGS: NOT AT ALL
SUM OF ALL RESPONSES TO PHQ9 QUESTIONS 1 AND 2: 0

## 2025-03-13 ASSESSMENT — ENCOUNTER SYMPTOMS
DIZZINESS: 0
CHILLS: 0
SLEEP DISTURBANCE: 0
ABDOMINAL PAIN: 0
FEVER: 0
ACTIVITY CHANGE: 0
SHORTNESS OF BREATH: 0
LIGHT-HEADEDNESS: 0
APPETITE CHANGE: 0

## 2025-03-13 NOTE — ASSESSMENT & PLAN NOTE
No changes to meds at [facility]   Orders:    levothyroxine (Synthroid, Levoxyl) 50 mcg tablet; Take 1 tablet (50 mcg) by mouth once daily.    Comprehensive Metabolic Panel; Future    CBC and Auto Differential; Future    TSH with reflex to Free T4 if abnormal; Future

## 2025-03-13 NOTE — ASSESSMENT & PLAN NOTE
Stable   Orders:    traZODone (Desyrel) 50 mg tablet; Take 1 tablet (50 mg) by mouth once daily at bedtime.

## 2025-03-13 NOTE — PATIENT INSTRUCTIONS
Impression  Left lower quadrant pain  Left groin lump  Bladder cancer  Urinary incontinence     Plan  CT abdomen and pelvis without contrast for left lower quadrant pain and lump  Appointment in 2 weeks   [>50% of Time Spent on Counseling and Coordination of Care for  ___] : Greater than 50% of the encounter time was spent on counseling and coordination of care for [unfilled]

## 2025-03-13 NOTE — ASSESSMENT & PLAN NOTE
Stable   Orders:    omeprazole (PriLOSEC) 20 mg DR capsule; Take 1 capsule (20 mg) by mouth once daily.    Comprehensive Metabolic Panel; Future    CBC and Auto Differential; Future

## 2025-03-13 NOTE — PROGRESS NOTES
Subjective   Patient ID: Magda Paz is a 74 y.o. female who presents for Hypertension, Hyperlipidemia, and Hypothyroidism (Recheck, review labs).    HPI   MIK:  Controlled with CPAP setting at 5-15  Compliant with cpap     HTN:   On lisinopril 10 mg   Usually in the 120s/70s    HLD/Atherosclerosis of aorta:   On atorvastatin 40 mg and no side effects fr meds   Lab Results   Component Value Date    LDLCALC 68 03/10/2025     Hypothyroid:   Controlled with levothyroxine 50 mcg  Lab Results   Component Value Date    TSH 1.84 2024     GERD:   Controlled with omeprazole 20 mg     Migraines:   Controlled with Maxalt 10 mg     RLS:   Controlled with ropinirole 3 mg 3 times daily  Patient states that if she misses a dose her symptoms become worse     Asthma/COPD  Minus of breath has been controlled with Anoro 1 puff daily, albuterol inhaler and nebulizer as needed  Uses Singulair and cetirizine 5 mg daily  Uses Flonase as needed  Sees pulmonologist      Vertebral artery occlusion  Carotid ultrasound done 2023 with right internal carotid <50% stenosis, right vertebral artery + retrograde flow?  Proximal stenosis or occlusion and >50% stenosis noted in right subclavian artery    Lung nodules  Has been getting yearly low-dose CT chest  2022 CTA with small stable nodules  2023 with new 7 mm RUL nodule,  2024 CT with stable subcentimeter parenchymal lung nodules, needs follow-up CT in 12 months        Depression:   Controlled with Wellbutrin 300 XL, Celexa 10 mg  Doing well with this   No suicidal ideation     Eczema:   Mometasone as needed    brother  3/1/22 from heart attack      borderine diabetes  Lab Results   Component Value Date    HGBA1C 6.2 (H) 03/10/2025     Chronic kidney disease  Cr 1.21, GFR 47     Has a history of bladder cancer 2020   \quit smoking 2015    Orders Only on 03/10/2025   Component Date Value Ref Range Status    CHOLESTEROL, TOTAL 03/10/2025 126  <200 mg/dL Final    HDL  CHOLESTEROL 03/10/2025 45 (L)  > OR = 50 mg/dL Final    TRIGLYCERIDES 03/10/2025 57  <150 mg/dL Final    LDL-CHOLESTEROL 03/10/2025 68  mg/dL (calc) Final    CHOL/HDLC RATIO 03/10/2025 2.8  <5.0 (calc) Final    NON HDL CHOLESTEROL 03/10/2025 81  <130 mg/dL (calc) Final    GLUCOSE 03/10/2025 111 (H)  65 - 99 mg/dL Final    UREA NITROGEN (BUN) 03/10/2025 29 (H)  7 - 25 mg/dL Final    CREATININE 03/10/2025 1.21 (H)  0.60 - 1.00 mg/dL Final    EGFR 03/10/2025 47 (L)  > OR = 60 mL/min/1.73m2 Final    SODIUM 03/10/2025 139  135 - 146 mmol/L Final    POTASSIUM 03/10/2025 4.5  3.5 - 5.3 mmol/L Final    CHLORIDE 03/10/2025 102  98 - 110 mmol/L Final    CARBON DIOXIDE 03/10/2025 30  20 - 32 mmol/L Final    ELECTROLYTE BALANCE 03/10/2025 7  7 - 17 mmol/L (calc) Final    CALCIUM 03/10/2025 9.0  8.6 - 10.4 mg/dL Final    PROTEIN, TOTAL 03/10/2025 6.8  6.1 - 8.1 g/dL Final    ALBUMIN 03/10/2025 4.2  3.6 - 5.1 g/dL Final    BILIRUBIN, TOTAL 03/10/2025 0.3  0.2 - 1.2 mg/dL Final    ALKALINE PHOSPHATASE 03/10/2025 76  37 - 153 U/L Final    AST 03/10/2025 19  10 - 35 U/L Final    ALT 03/10/2025 19  6 - 29 U/L Final    HEMOGLOBIN A1c 03/10/2025 6.2 (H)  <5.7 % of total Hgb Final    eAG (mg/dL) 03/10/2025 131  mg/dL Final    eAG (mmol/L) 03/10/2025 7.3  mmol/L Final       Review of Systems   Constitutional:  Negative for activity change, appetite change, chills and fever.   Eyes:  Negative for visual disturbance.   Respiratory:  Negative for shortness of breath.    Cardiovascular:  Negative for chest pain.   Gastrointestinal:  Negative for abdominal pain.   Skin:  Negative for rash.   Neurological:  Negative for dizziness and light-headedness.   Psychiatric/Behavioral:  Negative for sleep disturbance.        Objective   /78   Pulse 64   Temp 36.1 °C (97 °F)   Wt 76.2 kg (168 lb)   SpO2 92%   BMI 30.24 kg/m²     Physical Exam  Constitutional:       Appearance: Normal appearance.   Eyes:      Pupils: Pupils are equal, round,  and reactive to light.   Cardiovascular:      Rate and Rhythm: Normal rate and regular rhythm.   Pulmonary:      Effort: Pulmonary effort is normal.      Breath sounds: Normal breath sounds.   Abdominal:      General: Abdomen is flat. Bowel sounds are normal.      Palpations: Abdomen is soft.   Musculoskeletal:         General: Normal range of motion.   Skin:     General: Skin is warm.   Neurological:      General: No focal deficit present.      Mental Status: She is alert.   Psychiatric:         Mood and Affect: Mood normal.       Assessment/Plan   Assessment & Plan  Anxiety and depression  Stable  Orders:    buPROPion XL (Wellbutrin XL) 300 mg 24 hr tablet; Take 1 tablet (300 mg) by mouth once daily.    citalopram (CeleXA) 10 mg tablet; Take 1 tablet (10 mg) by mouth once daily.    Comprehensive Metabolic Panel; Future    CBC and Auto Differential; Future    Hypothyroidism, unspecified type  No changes to meds at [facility]   Orders:    levothyroxine (Synthroid, Levoxyl) 50 mcg tablet; Take 1 tablet (50 mcg) by mouth once daily.    Comprehensive Metabolic Panel; Future    CBC and Auto Differential; Future    TSH with reflex to Free T4 if abnormal; Future    Gastroesophageal reflux disease, unspecified whether esophagitis present  Stable   Orders:    omeprazole (PriLOSEC) 20 mg DR capsule; Take 1 capsule (20 mg) by mouth once daily.    Comprehensive Metabolic Panel; Future    CBC and Auto Differential; Future    Insomnia, unspecified type  Stable   Orders:    traZODone (Desyrel) 50 mg tablet; Take 1 tablet (50 mg) by mouth once daily at bedtime.    Chronic allergic rhinitis  Orders:    montelukast (Singulair) 10 mg tablet; Take 1 tablet (10 mg) by mouth once daily at bedtime.    Mixed hyperlipidemia  Orders:    atorvastatin (Lipitor) 40 mg tablet; Take 1 tablet (40 mg) by mouth once daily at bedtime.    Restless legs  Discussed risks vs benefit of starting meds and patient willing to accept the risks   Orders:     rOPINIRole (Requip) 3 mg tablet; Take 1 tablet (3 mg) by mouth 3 times a day.    Encounter for screening mammogram for malignant neoplasm of breast    Orders:    BI mammo bilateral screening tomosynthesis; Future    Impaired fasting glucose    Orders:    Comprehensive Metabolic Panel; Future    CBC and Auto Differential; Future    Hemoglobin A1C; Future    Stage 3a chronic kidney disease (Multi)    Orders:    Albumin-Creatinine Ratio, Urine Random; Future    Major depression, recurrent, chronic (CMS-HCC)         URI, acute    Orders:    benzonatate (Tessalon) 100 mg capsule; Take 1 capsule (100 mg) by mouth 3 times a day as needed for cough. Do not crush or chew.    Chronic obstructive pulmonary disease with (acute) exacerbation (Multi)

## 2025-03-13 NOTE — PROGRESS NOTES
03/13/2025  Acute onset left lower quadrant pain and fell and lump groin area patient her self.  Currently no pain    We discussed left lower quadrant pain and lump  We discussed the possibility of hernia  We discussed the CT scan abdomen and pelvis  All the questions were answered, the patient expressed understanding and agreed to the plan.    Impression  Left lower quadrant pain  Left groin lump  Bladder cancer  Urinary incontinence     Plan  CT abdomen and pelvis without contrast for left lower quadrant pain and lump  Appointment in 2 weeks  Chief Complaint   Patient presents with    Bladder Cancer     Pt is here today for groin/abdominal pain on the left side. She denies any voiding issues at this time.        Physical Exam     TODAYS LAB RESULTS:  POCT UA Automated manually resulted  Order: [id number]   Status: Final result       Visible to patient: Yes (not seen)       Next appt: 03/14/2025 at 07:00 AM in Radiology (POR MAMMO 1)       Dx: History of bladder cancer    0 Result Notes       Component  Ref Range & Units 15:48 8 mo ago   POC Glucose, Urine  NEGATIVE mg/dl NEGATIVE NEGATIVE   POC Bilirubin, Urine  NEGATIVE NEGATIVE NEGATIVE   POC Ketones, Urine  NEGATIVE mg/dl NEGATIVE NEGATIVE   POC Specific Gravity, Urine  1.005 - 1.035 1.010 1.020   POC Blood, Urine  NEGATIVE SMALL (1+) Abnormal  TRACE-Intact Abnormal    POC PH, Urine  No Reference Range Established PH 6.5 5.5   POC Protein, Urine  NEGATIVE mg/dl NEGATIVE NEGATIVE R   POC Urobilinogen, Urine  0.2, 1.0 EU/DL 0.2 0.2   Poc Nitrite, Urine  NEGATIVE NEGATIVE NEGATIVE   POC Leukocytes, Urine  NEGATIVE NEGATIVE NEGATIVE             Specimen Collected: 03/13/25 15:48 Last Resulted: 03/13/25 15:48       ASSESSMENT&PLAN:      IMPRESSIONS:     07/12/2024  Cystoscopy     A cystoscopy was performed under local without difficulty     Findings: Normal urethra, normal bladder no stone no tumor     We discussed bladder cancer, surveillance cystoscopy yearly  We  discussed urinary incontinence status post of urethral sling  All the questions were answered, the patient expressed understanding and agreed to the plan.     Impression  Bladder cancer  Urinary incontinence     Plan  Cystoscopy in a year        Subjective  Patient ID: Magda Paz is a 73 y.o. female who presents for Bladder Cancer (Patient is here today for yearly cysto was Dr. Iraheta pt).  HPI     Review of Systems        Objective  Physical Exam        Assessment/Plan        Slava Rider MD 07/12/24 12:31 PM      5/29/2019 [facility]      68 year old new patient referred from Dr. Cuellar due to hematuria. History of smoking. Per Dr. Cuellar's notes from 5/21/19, patient had a D&C with hysterectomy done 4/23/19 due to post menopausal bleeding. Patient has noticed blood in the toilet and on the toilet paper intermittently for the last 2 years and consistently for the last 3 months. She also notices blood on her undergarments when she is very active. Admits to suprapubic pressure, frequency, urgency and mixed urinary incontinence. Straight catheterized patient for 25 cc of clear yellow urine. UA positive for blood (+++). Discussed hematuria workup with cystoscopy and CT of the abdomen and pelvis with and without contrast in order to rule out carcinoma of the bladder. All the questions were answered, the patient expressed understanding and agreed to the plan.     Impression   Gross hematuria  Urinary frequency and urgency  Mixed urinary incontinence  Suprapubic pressure      Plan   Urine culture  Urine cytology   CT of the abdomen and pelvis with and without contrast  BUN and Cr   Cystoscopy         Surgery  8/19/2022 urethral sling  3/1/2022 cystoscopy bladder biopsy and fulguration Dr. Iraheta  7/5/2019 [facility]

## 2025-03-13 NOTE — PATIENT INSTRUCTIONS
Start drinking 64 oz of water daily     Pls consider the following vaccines:   We recommend that everyone over the age of 50 get vaccination against shingles called Shingrix.  It is more effective than the original shingles vaccination.  We also recommend COVID booster.

## 2025-03-13 NOTE — ASSESSMENT & PLAN NOTE
Stable  Orders:    buPROPion XL (Wellbutrin XL) 300 mg 24 hr tablet; Take 1 tablet (300 mg) by mouth once daily.    citalopram (CeleXA) 10 mg tablet; Take 1 tablet (10 mg) by mouth once daily.    Comprehensive Metabolic Panel; Future    CBC and Auto Differential; Future

## 2025-03-13 NOTE — ASSESSMENT & PLAN NOTE
Orders:    atorvastatin (Lipitor) 40 mg tablet; Take 1 tablet (40 mg) by mouth once daily at bedtime.

## 2025-03-13 NOTE — ASSESSMENT & PLAN NOTE
Discussed risks vs benefit of starting meds and patient willing to accept the risks   Orders:    rOPINIRole (Requip) 3 mg tablet; Take 1 tablet (3 mg) by mouth 3 times a day.

## 2025-03-14 ENCOUNTER — HOSPITAL ENCOUNTER (OUTPATIENT)
Dept: RADIOLOGY | Facility: HOSPITAL | Age: 75
Discharge: HOME | End: 2025-03-14
Payer: MEDICARE

## 2025-03-14 VITALS — HEIGHT: 63 IN | WEIGHT: 168 LBS | BODY MASS INDEX: 29.77 KG/M2

## 2025-03-14 DIAGNOSIS — R19.09 GROIN LUMP: ICD-10-CM

## 2025-03-14 DIAGNOSIS — Z85.51 HISTORY OF BLADDER CANCER: ICD-10-CM

## 2025-03-14 DIAGNOSIS — R10.32 LEFT LOWER QUADRANT ABDOMINAL PAIN: ICD-10-CM

## 2025-03-14 DIAGNOSIS — Z12.31 ENCOUNTER FOR SCREENING MAMMOGRAM FOR MALIGNANT NEOPLASM OF BREAST: ICD-10-CM

## 2025-03-14 PROCEDURE — 77063 BREAST TOMOSYNTHESIS BI: CPT | Performed by: RADIOLOGY

## 2025-03-14 PROCEDURE — 77067 SCR MAMMO BI INCL CAD: CPT | Performed by: RADIOLOGY

## 2025-03-14 PROCEDURE — 77063 BREAST TOMOSYNTHESIS BI: CPT

## 2025-04-01 ENCOUNTER — HOSPITAL ENCOUNTER (OUTPATIENT)
Dept: RADIOLOGY | Facility: HOSPITAL | Age: 75
Discharge: HOME | End: 2025-04-01
Payer: MEDICARE

## 2025-04-01 DIAGNOSIS — R19.09 GROIN LUMP: ICD-10-CM

## 2025-04-01 DIAGNOSIS — Z85.51 HISTORY OF BLADDER CANCER: ICD-10-CM

## 2025-04-01 DIAGNOSIS — R10.32 LEFT LOWER QUADRANT ABDOMINAL PAIN: ICD-10-CM

## 2025-04-01 PROCEDURE — 74176 CT ABD & PELVIS W/O CONTRAST: CPT

## 2025-04-04 DIAGNOSIS — K40.90 INGUINAL HERNIA WITHOUT OBSTRUCTION OR GANGRENE, RECURRENCE NOT SPECIFIED, UNSPECIFIED LATERALITY: ICD-10-CM

## 2025-04-17 ENCOUNTER — APPOINTMENT (OUTPATIENT)
Dept: UROLOGY | Facility: CLINIC | Age: 75
End: 2025-04-17
Payer: MEDICARE

## 2025-04-18 ENCOUNTER — APPOINTMENT (OUTPATIENT)
Dept: SURGERY | Facility: CLINIC | Age: 75
End: 2025-04-18
Payer: MEDICARE

## 2025-04-18 VITALS
SYSTOLIC BLOOD PRESSURE: 138 MMHG | HEART RATE: 65 BPM | OXYGEN SATURATION: 93 % | DIASTOLIC BLOOD PRESSURE: 81 MMHG | WEIGHT: 168.4 LBS | HEIGHT: 63 IN | BODY MASS INDEX: 29.84 KG/M2

## 2025-04-18 DIAGNOSIS — K40.90 INGUINAL HERNIA WITHOUT OBSTRUCTION OR GANGRENE, RECURRENCE NOT SPECIFIED, UNSPECIFIED LATERALITY: Primary | ICD-10-CM

## 2025-04-18 PROCEDURE — 1157F ADVNC CARE PLAN IN RCRD: CPT | Performed by: SURGERY

## 2025-04-18 PROCEDURE — 3075F SYST BP GE 130 - 139MM HG: CPT | Performed by: SURGERY

## 2025-04-18 PROCEDURE — 3079F DIAST BP 80-89 MM HG: CPT | Performed by: SURGERY

## 2025-04-18 PROCEDURE — 3008F BODY MASS INDEX DOCD: CPT | Performed by: SURGERY

## 2025-04-18 PROCEDURE — 99204 OFFICE O/P NEW MOD 45 MIN: CPT | Performed by: SURGERY

## 2025-04-18 PROCEDURE — 1159F MED LIST DOCD IN RCRD: CPT | Performed by: SURGERY

## 2025-04-18 PROCEDURE — 1036F TOBACCO NON-USER: CPT | Performed by: SURGERY

## 2025-04-18 RX ORDER — HEPARIN SODIUM 5000 [USP'U]/ML
5000 INJECTION, SOLUTION INTRAVENOUS; SUBCUTANEOUS ONCE
OUTPATIENT
Start: 2025-04-18 | End: 2025-04-18

## 2025-04-18 RX ORDER — CEFAZOLIN SODIUM 2 G/100ML
2 INJECTION, SOLUTION INTRAVENOUS ONCE
OUTPATIENT
Start: 2025-04-18 | End: 2025-04-18

## 2025-04-18 ASSESSMENT — ENCOUNTER SYMPTOMS
HEADACHES: 0
CHILLS: 0
DIZZINESS: 0
PALPITATIONS: 0
NAUSEA: 0
CONSTIPATION: 0
BLOOD IN STOOL: 0
COUGH: 0
FEVER: 0
SHORTNESS OF BREATH: 0
DIARRHEA: 0
VOMITING: 0
ABDOMINAL PAIN: 0

## 2025-04-18 NOTE — H&P (VIEW-ONLY)
GENERAL SURGERY CLINIC NOTE    Magda Paz   1950   [id number]     History Of Present Illness  Magda Paz is a 74 y.o. female who presents to the office for evaluation of a left inguinal hernia. She first noticed some bulging >1 mo ago and there is occasional pain with physical activity like walking.    She has COPD and uses O2 PRN.     The patient lives with her son and her niece lives nearby. Her niece was present for the appointment and will probably drive the patient the day of surgery.      Past Medical History  She has a past medical history of Allergic, Cancer (Multi), Chronic kidney disease, Colon polyp (2022), COPD (chronic obstructive pulmonary disease) (Multi), Depression, Enterocolitis due to Clostridium difficile, not specified as recurrent (07/01/2015), History of transfusion (2016), Lung disease (2023), Personal history of malignant neoplasm of bladder, Personal history of other diseases of the musculoskeletal system and connective tissue (07/01/2015), Personal history of other diseases of the musculoskeletal system and connective tissue (07/01/2015), Personal history of other diseases of the nervous system and sense organs (07/01/2015), Personal history of other diseases of urinary system (07/01/2015), Personal history of other mental and behavioral disorders (07/01/2015), and Restless legs syndrome (07/01/2015).    She has no past medical history of Personal history of irradiation.    Surgical History  She has a past surgical history that includes Other surgical history (05/29/2019); Other surgical history (05/29/2019); Other surgical history (05/29/2019); CT angio neck (12/01/2022); Tubal ligation; and Tonsillectomy.  No abdominal surgeries     Medications  Medications Ordered Prior to Encounter[1]    Allergies  Metoclopramide hcl, Sulfasalazine, Sulfa (sulfonamide antibiotics), Meloxicam, and Sulfamethoxazole     Social History  She reports that she quit smoking about 9 years ago. Her  "smoking use included cigarettes. She has a 22.5 pack-year smoking history. She has never used smokeless tobacco. She reports that she does not drink alcohol and does not use drugs.    Family History  Family History[2]     Review of Systems   Constitutional:  Negative for chills and fever.   Respiratory:  Negative for cough and shortness of breath.    Cardiovascular:  Negative for chest pain and palpitations.   Gastrointestinal:  Negative for abdominal pain, blood in stool, constipation, diarrhea, nausea and vomiting.   Neurological:  Negative for dizziness and headaches.   All other systems reviewed and are negative.      Last Recorded Vitals  Blood pressure 138/81, pulse 65, height 1.6 m (5' 3\"), weight 76.4 kg (168 lb 6.4 oz), SpO2 93%.     Physical Exam  Constitutional:       General: She is not in acute distress.     Appearance: Normal appearance. She is not ill-appearing.   HENT:      Head: Normocephalic and atraumatic.   Cardiovascular:      Rate and Rhythm: Normal rate and regular rhythm.   Pulmonary:      Effort: Pulmonary effort is normal. No respiratory distress.      Breath sounds: Normal breath sounds.   Abdominal:      General: There is no distension.      Palpations: Abdomen is soft.      Tenderness: There is no abdominal tenderness. There is no guarding.      Hernia: A hernia is present.      Comments: Small, reducible left inguinal hernia.   Musculoskeletal:         General: No swelling.   Skin:     General: Skin is warm and dry.   Neurological:      Mental Status: She is alert and oriented to person, place, and time. Mental status is at baseline.   Psychiatric:         Mood and Affect: Mood normal.         Behavior: Behavior normal.          Relevant Results    CT A/P 4/1/25  IMPRESSION:  Left inguinal hernia containing predominantly fat and a small  circumscribed structure measuring 1.3 x 2.3 cm with internal  Hounsfield units equivocal for fluid density. Findings may represent  small volume " entrapped fluid although other etiologies are not  excluded. Recommend ultrasound evaluation for further  characterization.      Assessment and Plan  74 y.o. female with a reducible, symptomatic left inguinal hernia that would benefit from open repair with mesh. The risks and benefits of the procedure were discussed. Risks include allergic reactions, heart or lung complications, bleeding, infection, injury to surrounding tissues (nerves, vessels), mesh infection/issues, hernia formation at incisions, additional planned or unplanned procedures required, nonresolution of symptoms, and pain. The patient expressed understanding and provided informed consent for surgery.     OR 5/6/25 for open repair of left inguinal hernia with mesh. RONNIE.    Karla Pedro MD, [facility]  General Surgery        [1]   Current Outpatient Medications on File Prior to Visit   Medication Sig Dispense Refill    albuterol 2.5 mg /3 mL (0.083 %) nebulizer solution Take 3 mL (2.5 mg) by nebulization every 4 hours if needed for wheezing. 75 mL 3    albuterol 90 mcg/actuation inhaler Inhale 1-2 puffs every 4 hours if needed. ProAir      aspirin 81 mg EC tablet Take 1 tablet (81 mg) by mouth once daily.      atorvastatin (Lipitor) 40 mg tablet Take 1 tablet (40 mg) by mouth once daily at bedtime. 90 tablet 1    azelastine (Astelin) 137 mcg (0.1 %) nasal spray Administer 1 spray into each nostril 2 times a day. Use in each nostril as directed 30 mL 3    buPROPion XL (Wellbutrin XL) 300 mg 24 hr tablet Take 1 tablet (300 mg) by mouth once daily. 90 tablet 1    calcium carbonate-vitamin D3 (Caltrate with Vitamin D3) 600 mg-20 mcg (800 unit) tablet Take 1 tablet by mouth once daily.      cetirizine (ZyrTEC) 5 mg tablet TAKE 1 TABLET BY MOUTH ONCE DAILY IN THE MORNING 90 tablet 3    citalopram (CeleXA) 10 mg tablet Take 1 tablet (10 mg) by mouth once daily. 90 tablet 1    famotidine (Pepcid) 40 mg tablet TAKE 1 TABLET BY MOUTH AT BEDTIME 30 tablet 11     fluticasone (Flonase) 50 mcg/actuation nasal spray Administer 1 spray into each nostril 2 times a day. 16 g 3    levothyroxine (Synthroid, Levoxyl) 50 mcg tablet Take 1 tablet (50 mcg) by mouth once daily. 90 tablet 1    lisinopril 10 mg tablet Take 1 tablet (10 mg) by mouth once daily. 90 tablet 2    montelukast (Singulair) 10 mg tablet Take 1 tablet (10 mg) by mouth once daily at bedtime. 90 tablet 1    multivitamin (Daily Multi-Vitamin) tablet Take 1 tablet by mouth once daily.      omeprazole (PriLOSEC) 20 mg DR capsule Take 1 capsule (20 mg) by mouth once daily. 90 capsule 1    rizatriptan (Maxalt) 10 mg tablet Take 1 tablet (10 mg) by mouth if needed.      rOPINIRole (Requip) 3 mg tablet Take 1 tablet (3 mg) by mouth 3 times a day. 270 tablet 1    traZODone (Desyrel) 50 mg tablet Take 1 tablet (50 mg) by mouth once daily at bedtime. 90 tablet 1    Anoro Ellipta 62.5-25 mcg/actuation blister with device Inhale 1 puff once daily. 3 each 3    [] benzonatate (Tessalon) 100 mg capsule Take 1 capsule (100 mg) by mouth 3 times a day as needed for cough. Do not crush or chew. 42 capsule 0     No current facility-administered medications on file prior to visit.   [2]   Family History  Problem Relation Name Age of Onset    Cervical cancer Mother Lea Saha     Cancer Mother Lea Saha     Cancer Daughter Sister 50

## 2025-04-18 NOTE — PROGRESS NOTES
GENERAL SURGERY CLINIC NOTE    Magda Paz   1950   [id number]     History Of Present Illness  Magda Paz is a 74 y.o. female who presents to the office for evaluation of a left inguinal hernia. She first noticed some bulging >1 mo ago and there is occasional pain with physical activity like walking.    She has COPD and uses O2 PRN.     The patient lives with her son and her niece lives nearby. Her niece was present for the appointment and will probably drive the patient the day of surgery.      Past Medical History  She has a past medical history of Allergic, Cancer (Multi), Chronic kidney disease, Colon polyp (2022), COPD (chronic obstructive pulmonary disease) (Multi), Depression, Enterocolitis due to Clostridium difficile, not specified as recurrent (07/01/2015), History of transfusion (2016), Lung disease (2023), Personal history of malignant neoplasm of bladder, Personal history of other diseases of the musculoskeletal system and connective tissue (07/01/2015), Personal history of other diseases of the musculoskeletal system and connective tissue (07/01/2015), Personal history of other diseases of the nervous system and sense organs (07/01/2015), Personal history of other diseases of urinary system (07/01/2015), Personal history of other mental and behavioral disorders (07/01/2015), and Restless legs syndrome (07/01/2015).    She has no past medical history of Personal history of irradiation.    Surgical History  She has a past surgical history that includes Other surgical history (05/29/2019); Other surgical history (05/29/2019); Other surgical history (05/29/2019); CT angio neck (12/01/2022); Tubal ligation; and Tonsillectomy.  No abdominal surgeries     Medications  Medications Ordered Prior to Encounter[1]    Allergies  Metoclopramide hcl, Sulfasalazine, Sulfa (sulfonamide antibiotics), Meloxicam, and Sulfamethoxazole     Social History  She reports that she quit smoking about 9 years ago. Her  "smoking use included cigarettes. She has a 22.5 pack-year smoking history. She has never used smokeless tobacco. She reports that she does not drink alcohol and does not use drugs.    Family History  Family History[2]     Review of Systems   Constitutional:  Negative for chills and fever.   Respiratory:  Negative for cough and shortness of breath.    Cardiovascular:  Negative for chest pain and palpitations.   Gastrointestinal:  Negative for abdominal pain, blood in stool, constipation, diarrhea, nausea and vomiting.   Neurological:  Negative for dizziness and headaches.   All other systems reviewed and are negative.      Last Recorded Vitals  Blood pressure 138/81, pulse 65, height 1.6 m (5' 3\"), weight 76.4 kg (168 lb 6.4 oz), SpO2 93%.     Physical Exam  Constitutional:       General: She is not in acute distress.     Appearance: Normal appearance. She is not ill-appearing.   HENT:      Head: Normocephalic and atraumatic.   Cardiovascular:      Rate and Rhythm: Normal rate and regular rhythm.   Pulmonary:      Effort: Pulmonary effort is normal. No respiratory distress.      Breath sounds: Normal breath sounds.   Abdominal:      General: There is no distension.      Palpations: Abdomen is soft.      Tenderness: There is no abdominal tenderness. There is no guarding.      Hernia: A hernia is present.      Comments: Small, reducible left inguinal hernia.   Musculoskeletal:         General: No swelling.   Skin:     General: Skin is warm and dry.   Neurological:      Mental Status: She is alert and oriented to person, place, and time. Mental status is at baseline.   Psychiatric:         Mood and Affect: Mood normal.         Behavior: Behavior normal.          Relevant Results    CT A/P 4/1/25  IMPRESSION:  Left inguinal hernia containing predominantly fat and a small  circumscribed structure measuring 1.3 x 2.3 cm with internal  Hounsfield units equivocal for fluid density. Findings may represent  small volume " entrapped fluid although other etiologies are not  excluded. Recommend ultrasound evaluation for further  characterization.      Assessment and Plan  74 y.o. female with a reducible, symptomatic left inguinal hernia that would benefit from open repair with mesh. The risks and benefits of the procedure were discussed. Risks include allergic reactions, heart or lung complications, bleeding, infection, injury to surrounding tissues (nerves, vessels), mesh infection/issues, hernia formation at incisions, additional planned or unplanned procedures required, nonresolution of symptoms, and pain. The patient expressed understanding and provided informed consent for surgery.     OR 5/6/25 for open repair of left inguinal hernia with mesh. RONNIE.    Karla Pedro MD, [facility]  General Surgery        [1]   Current Outpatient Medications on File Prior to Visit   Medication Sig Dispense Refill    albuterol 2.5 mg /3 mL (0.083 %) nebulizer solution Take 3 mL (2.5 mg) by nebulization every 4 hours if needed for wheezing. 75 mL 3    albuterol 90 mcg/actuation inhaler Inhale 1-2 puffs every 4 hours if needed. ProAir      aspirin 81 mg EC tablet Take 1 tablet (81 mg) by mouth once daily.      atorvastatin (Lipitor) 40 mg tablet Take 1 tablet (40 mg) by mouth once daily at bedtime. 90 tablet 1    azelastine (Astelin) 137 mcg (0.1 %) nasal spray Administer 1 spray into each nostril 2 times a day. Use in each nostril as directed 30 mL 3    buPROPion XL (Wellbutrin XL) 300 mg 24 hr tablet Take 1 tablet (300 mg) by mouth once daily. 90 tablet 1    calcium carbonate-vitamin D3 (Caltrate with Vitamin D3) 600 mg-20 mcg (800 unit) tablet Take 1 tablet by mouth once daily.      cetirizine (ZyrTEC) 5 mg tablet TAKE 1 TABLET BY MOUTH ONCE DAILY IN THE MORNING 90 tablet 3    citalopram (CeleXA) 10 mg tablet Take 1 tablet (10 mg) by mouth once daily. 90 tablet 1    famotidine (Pepcid) 40 mg tablet TAKE 1 TABLET BY MOUTH AT BEDTIME 30 tablet 11     fluticasone (Flonase) 50 mcg/actuation nasal spray Administer 1 spray into each nostril 2 times a day. 16 g 3    levothyroxine (Synthroid, Levoxyl) 50 mcg tablet Take 1 tablet (50 mcg) by mouth once daily. 90 tablet 1    lisinopril 10 mg tablet Take 1 tablet (10 mg) by mouth once daily. 90 tablet 2    montelukast (Singulair) 10 mg tablet Take 1 tablet (10 mg) by mouth once daily at bedtime. 90 tablet 1    multivitamin (Daily Multi-Vitamin) tablet Take 1 tablet by mouth once daily.      omeprazole (PriLOSEC) 20 mg DR capsule Take 1 capsule (20 mg) by mouth once daily. 90 capsule 1    rizatriptan (Maxalt) 10 mg tablet Take 1 tablet (10 mg) by mouth if needed.      rOPINIRole (Requip) 3 mg tablet Take 1 tablet (3 mg) by mouth 3 times a day. 270 tablet 1    traZODone (Desyrel) 50 mg tablet Take 1 tablet (50 mg) by mouth once daily at bedtime. 90 tablet 1    Anoro Ellipta 62.5-25 mcg/actuation blister with device Inhale 1 puff once daily. 3 each 3    [] benzonatate (Tessalon) 100 mg capsule Take 1 capsule (100 mg) by mouth 3 times a day as needed for cough. Do not crush or chew. 42 capsule 0     No current facility-administered medications on file prior to visit.   [2]   Family History  Problem Relation Name Age of Onset    Cervical cancer Mother Lea Saha     Cancer Mother Lea Saha     Cancer Daughter Sister 50

## 2025-05-02 ENCOUNTER — PRE-ADMISSION TESTING (OUTPATIENT)
Dept: PREADMISSION TESTING | Facility: HOSPITAL | Age: 75
End: 2025-05-02
Payer: MEDICARE

## 2025-05-02 VITALS
OXYGEN SATURATION: 93 % | HEIGHT: 63 IN | DIASTOLIC BLOOD PRESSURE: 67 MMHG | SYSTOLIC BLOOD PRESSURE: 140 MMHG | RESPIRATION RATE: 16 BRPM | TEMPERATURE: 98 F | HEART RATE: 65 BPM | BODY MASS INDEX: 28.88 KG/M2 | WEIGHT: 163 LBS

## 2025-05-02 DIAGNOSIS — K21.9 GASTROESOPHAGEAL REFLUX DISEASE WITHOUT ESOPHAGITIS: ICD-10-CM

## 2025-05-02 DIAGNOSIS — Z01.818 PRE-OP EVALUATION: Primary | ICD-10-CM

## 2025-05-02 DIAGNOSIS — I10 BENIGN ESSENTIAL HYPERTENSION: ICD-10-CM

## 2025-05-02 DIAGNOSIS — I70.0 ATHEROSCLEROSIS OF AORTA (CMS-HCC): ICD-10-CM

## 2025-05-02 DIAGNOSIS — K40.90 NON-RECURRENT UNILATERAL INGUINAL HERNIA WITHOUT OBSTRUCTION OR GANGRENE: ICD-10-CM

## 2025-05-02 DIAGNOSIS — J43.9 PULMONARY EMPHYSEMA, UNSPECIFIED EMPHYSEMA TYPE (MULTI): ICD-10-CM

## 2025-05-02 LAB
ANION GAP SERPL CALC-SCNC: 8 MMOL/L (ref 10–20)
BUN SERPL-MCNC: 25 MG/DL (ref 6–23)
CALCIUM SERPL-MCNC: 9.3 MG/DL (ref 8.6–10.3)
CHLORIDE SERPL-SCNC: 104 MMOL/L (ref 98–107)
CO2 SERPL-SCNC: 28 MMOL/L (ref 21–32)
CREAT SERPL-MCNC: 1.07 MG/DL (ref 0.5–1.05)
EGFRCR SERPLBLD CKD-EPI 2021: 55 ML/MIN/1.73M*2
ERYTHROCYTE [DISTWIDTH] IN BLOOD BY AUTOMATED COUNT: 13.3 % (ref 11.5–14.5)
GLUCOSE SERPL-MCNC: 110 MG/DL (ref 74–99)
HCT VFR BLD AUTO: 42.8 % (ref 36–46)
HGB BLD-MCNC: 13.9 G/DL (ref 12–16)
MCH RBC QN AUTO: 28.9 PG (ref 26–34)
MCHC RBC AUTO-ENTMCNC: 32.5 G/DL (ref 32–36)
MCV RBC AUTO: 89 FL (ref 80–100)
NRBC BLD-RTO: 0 /100 WBCS (ref 0–0)
PLATELET # BLD AUTO: 197 X10*3/UL (ref 150–450)
POTASSIUM SERPL-SCNC: 4.3 MMOL/L (ref 3.5–5.3)
RBC # BLD AUTO: 4.81 X10*6/UL (ref 4–5.2)
SODIUM SERPL-SCNC: 136 MMOL/L (ref 136–145)
WBC # BLD AUTO: 7.1 X10*3/UL (ref 4.4–11.3)

## 2025-05-02 PROCEDURE — 36415 COLL VENOUS BLD VENIPUNCTURE: CPT

## 2025-05-02 PROCEDURE — 85027 COMPLETE CBC AUTOMATED: CPT

## 2025-05-02 PROCEDURE — 82374 ASSAY BLOOD CARBON DIOXIDE: CPT

## 2025-05-02 PROCEDURE — 99203 OFFICE O/P NEW LOW 30 MIN: CPT | Performed by: NURSE PRACTITIONER

## 2025-05-02 RX ORDER — FAMOTIDINE 40 MG/1
40 TABLET, FILM COATED ORAL NIGHTLY
Qty: 30 TABLET | Refills: 4 | Status: SHIPPED | OUTPATIENT
Start: 2025-05-02

## 2025-05-02 ASSESSMENT — DUKE ACTIVITY SCORE INDEX (DASI)
CAN YOU DO YARD WORK LIKE RAKING LEAVES, WEEDING OR PUSHING A MOWER: YES
TOTAL_SCORE: 20.7
CAN YOU DO LIGHT WORK AROUND THE HOUSE LIKE DUSTING OR WASHING DISHES: YES
CAN YOU DO MODERATE WORK AROUND THE HOUSE LIKE VACUUMING, SWEEPING FLOORS OR CARRYING GROCERIES: YES
CAN YOU PARTICIPATE IN STRENOUS SPORTS LIKE SWIMMING, SINGLES TENNIS, FOOTBALL, BASKETBALL, OR SKIING: NO
CAN YOU PARTICIPATE IN MODERATE RECREATIONAL ACTIVITIES LIKE GOLF, BOWLING, DANCING, DOUBLES TENNIS OR THROWING A BASEBALL OR FOOTBALL: NO
CAN YOU TAKE CARE OF YOURSELF (EAT, DRESS, BATHE, OR USE TOILET): YES
CAN YOU WALK A BLOCK OR TWO ON LEVEL GROUND: NO
CAN YOU RUN A SHORT DISTANCE: NO
CAN YOU DO HEAVY WORK AROUND THE HOUSE LIKE SCRUBBING FLOORS OR LIFTING AND MOVING HEAVY FURNITURE: NO
CAN YOU CLIMB A FLIGHT OF STAIRS OR WALK UP A HILL: YES
CAN YOU HAVE SEXUAL RELATIONS: NO
DASI METS SCORE: 5.3
CAN YOU WALK INDOORS, SUCH AS AROUND YOUR HOUSE: YES

## 2025-05-02 ASSESSMENT — ACTIVITIES OF DAILY LIVING (ADL): ADL_SCORE: 0

## 2025-05-02 ASSESSMENT — LIFESTYLE VARIABLES: SMOKING_STATUS: NONSMOKER

## 2025-05-02 NOTE — CPM/PAT H&P
CPM/PAT Evaluation       Name: Magda Paz (Magda Paz)  /Age: 1950/74 y.o.     In-Person       Chief Complaint: pre-operative RN visit for scheduled open repair of left inguinal hernia with mesh with Dr. Pedro on 25.     HPI    Medical History[1]    Surgical History[2]    Patient  reports never being sexually active.    Family History[3]    Allergies[4]    Prior to Admission medications    Medication Sig Start Date End Date Taking? Authorizing Provider   albuterol 2.5 mg /3 mL (0.083 %) nebulizer solution Take 3 mL (2.5 mg) by nebulization every 4 hours if needed for wheezing. 3/6/24   Gloria Potter DO   albuterol 90 mcg/actuation inhaler Inhale 1-2 puffs every 4 hours if needed. ProAir    Historical Provider, MD   Anoro Ellipta 62.5-25 mcg/actuation blister with device Inhale 1 puff once daily. 24  Clemente Barrow MD   aspirin 81 mg EC tablet Take 1 tablet (81 mg) by mouth once daily.    Historical Provider, MD   atorvastatin (Lipitor) 40 mg tablet Take 1 tablet (40 mg) by mouth once daily at bedtime. 3/13/25   Stephanie Castrejon MD   azelastine (Astelin) 137 mcg (0.1 %) nasal spray Administer 1 spray into each nostril 2 times a day. Use in each nostril as directed 25   NILA Rico   buPROPion XL (Wellbutrin XL) 300 mg 24 hr tablet Take 1 tablet (300 mg) by mouth once daily. 3/13/25 9/9/25  Stephanie Castrejon MD   calcium carbonate-vitamin D3 (Caltrate with Vitamin D3) 600 mg-20 mcg (800 unit) tablet Take 1 tablet by mouth once daily. 07   Historical Provider, MD   cetirizine (ZyrTEC) 5 mg tablet TAKE 1 TABLET BY MOUTH ONCE DAILY IN THE MORNING 24   NILA Rico   citalopram (CeleXA) 10 mg tablet Take 1 tablet (10 mg) by mouth once daily. 3/13/25 9/9/25  Stephanie Castrejon MD   famotidine (Pepcid) 40 mg tablet TAKE 1 TABLET BY MOUTH AT BEDTIME 25   CHELSEA Rico-CNP   fluticasone (Flonase) 50 mcg/actuation nasal spray Administer  "1 spray into each nostril 2 times a day. 1/31/25   NILA Rico   levothyroxine (Synthroid, Levoxyl) 50 mcg tablet Take 1 tablet (50 mcg) by mouth once daily. 3/13/25 9/9/25  Stephanie Castrejon MD   lisinopril 10 mg tablet Take 1 tablet (10 mg) by mouth once daily. 12/4/24 8/31/25  NILA Hobbs   montelukast (Singulair) 10 mg tablet Take 1 tablet (10 mg) by mouth once daily at bedtime. 3/13/25   Stephanie Castrejon MD   multivitamin (Daily Multi-Vitamin) tablet Take 1 tablet by mouth once daily.    Historical Provider, MD   omeprazole (PriLOSEC) 20 mg DR capsule Take 1 capsule (20 mg) by mouth once daily. 3/13/25 9/9/25  Stephanie Castrejon MD   rizatriptan (Maxalt) 10 mg tablet Take 1 tablet (10 mg) by mouth if needed.    Historical Provider, MD   rOPINIRole (Requip) 3 mg tablet Take 1 tablet (3 mg) by mouth 3 times a day. 3/13/25   Stephanie Castrejon MD   traZODone (Desyrel) 50 mg tablet Take 1 tablet (50 mg) by mouth once daily at bedtime. 3/13/25 9/9/25  Stephanie Castrejon MD   famotidine (Pepcid) 40 mg tablet TAKE 1 TABLET BY MOUTH AT BEDTIME  Patient not taking: Reported on 5/2/2025 6/3/24 5/2/25  NILA Rico        PAT ROS     PAT Physical Exam     Airway    Testing/Diagnostic:     Patient Specialist/PCP:     Visit Vitals  /67   Pulse 65   Temp 36.7 °C (98 °F) (Temporal)   Resp 16   Ht 1.6 m (5' 3\")   Wt 73.9 kg (163 lb)   SpO2 93%   BMI 28.87 kg/m²   OB Status Postmenopausal   Smoking Status Former   BSA 1.81 m²       DASI Risk Score      Flowsheet Row Pre-Admission Testing from 5/2/2025 in  [facility] Questionnaire Series Submission from 4/19/2025 in [facility] OR with Generic Provider Mychart   Can you take care of yourself (eat, dress, bathe, or use toilet)?  2.75 filed at 05/02/2025 1049 2.75  filed at 04/19/2025 1626   Can you walk indoors, such as around your house? 1.75 filed at 05/02/2025 1049 1.75  filed at 04/19/2025 1626   Can you walk " a block or two on level ground?  0 filed at 05/02/2025 1049 0  filed at 04/19/2025 1626   Can you climb a flight of stairs or walk up a hill? 5.5 filed at 05/02/2025 1049 5.5  filed at 04/19/2025 1626   Can you run a short distance? 0 filed at 05/02/2025 1049 0  filed at 04/19/2025 1626   Can you do light work around the house like dusting or washing dishes? 2.7 filed at 05/02/2025 1049 2.7  filed at 04/19/2025 1626   Can you do moderate work around the house like vacuuming, sweeping floors or carrying groceries? 3.5 filed at 05/02/2025 1049 3.5  filed at 04/19/2025 1626   Can you do heavy work around the house like scrubbing floors or lifting and moving heavy furniture?  0 filed at 05/02/2025 1049 0  filed at 04/19/2025 1626   Can you do yard work like raking leaves, weeding or pushing a mower? 4.5 filed at 05/02/2025 1049 4.5  filed at 04/19/2025 1626   Can you have sexual relations? 0 filed at 05/02/2025 1049 0  filed at 04/19/2025 1626   Can you participate in moderate recreational activities like golf, bowling, dancing, doubles tennis or throwing a baseball or football? 0 filed at 05/02/2025 1049 0  filed at 04/19/2025 1626   Can you participate in strenous sports like swimming, singles tennis, football, basketball, or skiing? 0 filed at 05/02/2025 1049 0  filed at 04/19/2025 1626   DASI SCORE 20.7 filed at 05/02/2025 1049 20.7  filed at 04/19/2025 1626   METS Score (Will be calculated only when all the questions are answered) 5.3 filed at 05/02/2025 1049 5.3  filed at 04/19/2025 1626          Caprini DVT Assessment      Flowsheet Row Pre-Admission Testing from 5/2/2025 in  [facility]   DVT Score (IF A SCORE IS NOT CALCULATING, MUST SELECT A BMI TO COMPLETE) 8 filed at 05/02/2025 1045   Medical Factors Present cancer, chemotherapy, or previous malignancy, COPD filed at 05/02/2025 1045   Surgical Factors Major surgery planned, including arthroscopic and laproscopic (1-2 hours) filed at 05/02/2025  1045   BMI (BMI MUST BE CHOSEN) 30 or less filed at 05/02/2025 1045          Modified Frailty Index      Flowsheet Row Pre-Admission Testing from 5/2/2025 in  [facility]   Non-independent functional status (problems with dressing, bathing, personal grooming, or cooking) 0 filed at 05/02/2025 1050   History of diabetes mellitus  0 filed at 05/02/2025 1050   History of COPD 0.0909 filed at 05/02/2025 1050   History of CHF No filed at 05/02/2025 1050   History of MI 0 filed at 05/02/2025 1050   History of Percutaneous Coronary Intervention, Cardiac Surgery, or Angina No filed at 05/02/2025 1050   Hypertension requiring the use of medication  0.0909 filed at 05/02/2025 1050   Peripheral vascular disease 0 filed at 05/02/2025 1050   Impaired sensorium (cognitive impairement or loss, clouding, or delirium) 0 filed at 05/02/2025 1050   TIA or CVA withouy residual deficit 0 filed at 05/02/2025 1050   Cerebrovascular accident with deficit 0 filed at 05/02/2025 1050   Modified Frailty Index Calculator .1818 filed at 05/02/2025 1050          [id number] Stroke Risk Points  Current as of 11 minutes ago        N/A 0 to 9 Points:      Last Change: N/A          The [id number] risk score (Lip NESTOR, et al. 2009. © 2010 [employer]) quantifies the risk of stroke for a patient with atrial fibrillation. For patients without atrial fibrillation or under the age of 18 this score appears as N/A. Higher score values generally indicate higher risk of stroke.        This score is not applicable to this patient. Components are not calculated.          Revised Cardiac Risk Index      Flowsheet Row Pre-Admission Testing from 5/2/2025 in  [facility]   High-Risk Surgery (Intraperitoneal, Intrathoracic,Suprainguinal vascular) 0 filed at 05/02/2025 1049   History of ischemic heart disease (History of MI, History of positive exercuse test, Current chest paint considered due to myocardial  ischemia, Use of nitrate therapy, ECG with pathological Q Waves) 0 filed at 2025 1049   History of congestive heart failure (pulmonary edemia, bilateral rales or S3 gallop, Paroxysmal nocturnal dyspnea, CXR showing pulmonary vascular redistribution) 0 filed at 2025 1049   History of cerebrovascular disease (Prior TIA or stroke) 0 filed at 2025 1049   Pre-operative insulin treatment 0 filed at 2025 1049   Pre-operative creatinine>2 mg/dl 0 filed at 2025 1049   Revised Cardiac Risk Calculator 0 filed at 2025 1049          Apfel Simplified Score      Flowsheet Row Pre-Admission Testing from 2025 in  [facility]   Smoking status 1 filed at 2025 1050   History of motion sickness or PONV  0 filed at 2025 1050   Use of postoperative opioids 1 filed at 2025 1050   Gender - Female 1=Yes filed at 2025 1050   Apfel Simplified Score Calculator 3 filed at 2025 1050          Risk Analysis Index Results This Encounter         2025  1051             Do you live in a place other than your own home?: 0    Any kidney failure, kidney not working well, or seeing a kidney doctor (nephrologist)? If yes, was this for kidney stones or another problem?: 8 Other (Comment)    Any history of chronic (long-term) congestive heart failure (CHF)?: 0 No    Any shortness of breath when resting?: 0 No    In the past five years, have you been diagnosed with or treated for cancer?: Yes    During the last 3 months has it become difficult for you to remember things or organize your thoughts?: 0 No    Have you lost weight of 10 pounds or more in the past 3 months without trying?: 0 No    Do you have any loss of appetitie?: 0 No    Getting Around (Mobility): 0 Can get around without help    Eatin Can plan and prepare own meals    Toiletin Can use toilet without any help    Personal Hygiene (Bathing, Hand Washing, Changing Clothes): 0 Can shower or bathe without  any help    JONAS Cancer History: Patient indicates history of cancer    Total Risk Analysis Index Score Without Cancer: 30    Total Risk Analysis Index Score: 42          Stop Bang Score      Flowsheet Row Pre-Admission Testing from 5/2/2025 in  [facility]   Do you snore loudly? 1 filed at 05/02/2025 0738   Do you often feel tired or fatigued after your sleep? 1 filed at 05/02/2025 0738   Has anyone ever observed you stop breathing in your sleep? 1 filed at 05/02/2025 0738   Do you have or are you being treated for high blood pressure? 1 filed at 05/02/2025 0738   Recent BMI (Calculated) 29.8 filed at 05/02/2025 0738   Is BMI greater than 35 kg/m2? 0=No filed at 05/02/2025 0738   Age older than 50 years old? 1=Yes filed at 05/02/2025 0738   Is your neck circumference greater than 17 inches (Male) or 16 inches (Female)? 0 filed at 05/02/2025 0738   Gender - Male 0=No filed at 05/02/2025 0738   STOP-BANG Total Score 5 filed at 05/02/2025 0738          Prodigy: High Risk  Total Score: 12              Prodigy Age Score           ARISCAT Score for Postoperative Pulmonary Complications      Flowsheet Row Pre-Admission Testing from 5/2/2025 in  [facility]   Age Calculated Score 3 filed at 05/02/2025 1052   Preoperative SpO2 8 filed at 05/02/2025 1052   Respiratory infection in the last month Either upper or lower (i.e., URI, bronchitis, pneumonia), with fever and antibiotic treatment 0 filed at 05/02/2025 1052   Preoperative anemia (Hgb less than 10 g/dl) 0 filed at 05/02/2025 1052   Surgical incision  0 filed at 05/02/2025 1052   Duration of surgery  0 filed at 05/02/2025 1052   Emergency Procedure  0 filed at 05/02/2025 1052   ARISCAT Total Score  11 filed at 05/02/2025 1052          Walker Perioperative Risk for Myocardial Infarction or Cardiac Arrest (RAUL)      Flowsheet Row Pre-Admission Testing from 5/2/2025 in  [facility]   Calculated Age Score 1.48 filed at 05/02/2025  1052   Functional Status  0 filed at 05/02/2025 1052   ASA Class  -3.29 filed at 05/02/2025 1052   Creatinine 0 filed at 05/02/2025 1052   Type of Procedure  0 filed at 05/02/2025 1052   RAUL Total Score  -7.06 filed at 05/02/2025 1052   RAUL % 0.09 filed at 05/02/2025 1052          1. Pre-op evaluation  ECG 12 lead (Clinic Performed)    Basic metabolic panel    CBC    Basic metabolic panel    CBC      2. Atherosclerosis of aorta (CMS-HCC)  ECG 12 lead (Clinic Performed)    Basic metabolic panel    CBC    Basic metabolic panel    CBC      3. Benign essential hypertension  ECG 12 lead (Clinic Performed)    Basic metabolic panel    CBC    Basic metabolic panel    CBC      4. Non-recurrent unilateral inguinal hernia without obstruction or gangrene  ECG 12 lead (Clinic Performed)    Basic metabolic panel    CBC    Basic metabolic panel    CBC      5. Pulmonary emphysema, unspecified emphysema type (Multi)  ECG 12 lead (Clinic Performed)    Basic metabolic panel    CBC    Basic metabolic panel    CBC         Pre-Admission Testing on 05/02/2025   Component Date Value Ref Range Status    Glucose 05/02/2025 110 (H)  74 - 99 mg/dL Final    Sodium 05/02/2025 136  136 - 145 mmol/L Final    Potassium 05/02/2025 4.3  3.5 - 5.3 mmol/L Final    Chloride 05/02/2025 104  98 - 107 mmol/L Final    Bicarbonate 05/02/2025 28  21 - 32 mmol/L Final    Anion Gap 05/02/2025 8 (L)  10 - 20 mmol/L Final    Urea Nitrogen 05/02/2025 25 (H)  6 - 23 mg/dL Final    Creatinine 05/02/2025 1.07 (H)  0.50 - 1.05 mg/dL Final    eGFR 05/02/2025 55 (L)  >60 mL/min/1.73m*2 Final    Calculations of estimated GFR are performed using the 2021 CKD-EPI Study Refit equation without the race variable for the IDMS-Traceable creatinine methods.  https://jasn.asnjournals.org/content/early/2021/09/22/ASN.5864682544    Calcium 05/02/2025 9.3  8.6 - 10.3 mg/dL Final    WBC 05/02/2025 7.1  4.4 - 11.3 x10*3/uL Final    nRBC 05/02/2025 0.0  0.0 - 0.0 /100 WBCs Final     RBC 05/02/2025 4.81  4.00 - 5.20 x10*6/uL Final    Hemoglobin 05/02/2025 13.9  12.0 - 16.0 g/dL Final    Hematocrit 05/02/2025 42.8  36.0 - 46.0 % Final    MCV 05/02/2025 89  80 - 100 fL Final    MCH 05/02/2025 28.9  26.0 - 34.0 pg Final    MCHC 05/02/2025 32.5  32.0 - 36.0 g/dL Final    RDW 05/02/2025 13.3  11.5 - 14.5 % Final    Platelets 05/02/2025 197  150 - 450 x10*3/uL Final         Assessment and Plan:     Other: pre-operative RN visit for scheduled open repair of left inguinal hernia with mesh with Dr. Pedro on 5/6/25.   EKG today shows SR with rate of 62, probable left atrial enlargement, right axis deviation, and borderline T abnormalities, anterior leads.  Previous EKG on 2/28/22 showed SR with rate of 62 with right axis deviation.  BMP today WNL except glucose 110 H, Anion gap 8 L, BUN 1.07 H, and eGFR 55 L.  The RN discussed the dosing of her medications.  All surgery instructions reviewed with patient by RN. Verbalized understanding.    Encouraged early ambulation, DVT/ PE prevention, constipation prevention, and C&DB post operatively. Patient verbalized understanding.    Reviewed Dr. Pedro's note 4/18/25, cardiology note with Dr. Walker on 9/13/24, PCP note with Dr. Castrejon on 3/13/25, and Pulmonology note with Dr. Barrow on 9/6/24.    Kacy Blanco, APRN-CNP              [1]   Past Medical History:  Diagnosis Date    Allergic     Arthritis     Cancer (Multi)     Chronic kidney disease     Colon polyp 2022    COPD (chronic obstructive pulmonary disease) (Multi)     Coronary artery disease     Depression     Enterocolitis due to Clostridium difficile, not specified as recurrent 07/01/2015    C. difficile colitis    GERD (gastroesophageal reflux disease)     History of transfusion 2016    Hyperlipidemia     Hypertension     Hypothyroidism     Lung disease 2023    Personal history of malignant neoplasm of bladder     History of malignant neoplasm of bladder    Personal history of other diseases of the  "musculoskeletal system and connective tissue 2015    History of arthritis    Personal history of other diseases of the musculoskeletal system and connective tissue 2015    History of osteoarthritis    Personal history of other diseases of the nervous system and sense organs 2015    History of migraine    Restless legs syndrome 2015    Restless legs syndrome    Sleep apnea    [2]   Past Surgical History:  Procedure Laterality Date    CT ANGIO NECK  2022    CT NECK ANGIO W AND WO IV CONTRAST 2022 [facility]    ELBOW SURGERY Left     nerve    OTHER SURGICAL HISTORY  2019    Dilation and curettage    OTHER SURGICAL HISTORY  2019    Cervical disc replacement    ROTATOR CUFF REPAIR      TONSILLECTOMY      TUBAL LIGATION     [3]   Family History  Problem Relation Name Age of Onset    Cervical cancer Mother Lea Saha     Cancer Mother Lea Saha     Cancer Sister  50   [4]   Allergies  Allergen Reactions    Metoclopramide Hcl Palpitations, Shortness of breath and Other     chest pain    Sulfa (Sulfonamide Antibiotics) Other     Severe leukopenia      Sulfasalazine Anaphylaxis and Other     severe leukopenia    \"almost ,attacked my blood, thraot closed, intubated\"    Meloxicam Rash     rash    Sulfamethoxazole Other     "

## 2025-05-02 NOTE — PREPROCEDURE INSTRUCTIONS
Medication List            Accurate as of May 2, 2025  7:55 AM. Always use your most recent med list.                * albuterol 90 mcg/actuation inhaler     * albuterol 2.5 mg /3 mL (0.083 %) nebulizer solution  Take 3 mL (2.5 mg) by nebulization every 4 hours if needed for wheezing.  Medication Adjustments for Surgery: Take on the morning of surgery  Notes to patient: Bring with you day of surgery     Anoro Ellipta 62.5-25 mcg/actuation blister with inhaler device  Generic drug: umeclidinium-vilanteroL  Inhale 1 puff once daily.  Medication Adjustments for Surgery: Take on the morning of surgery     aspirin 81 mg EC tablet  Medication Adjustments for Surgery: Do Not take on the morning of surgery     atorvastatin 40 mg tablet  Commonly known as: Lipitor  Take 1 tablet (40 mg) by mouth once daily at bedtime.  Medication Adjustments for Surgery: Do Not take on the morning of surgery     azelastine 137 mcg (0.1 %) nasal spray  Commonly known as: Astelin  Administer 1 spray into each nostril 2 times a day. Use in each nostril as directed  Medication Adjustments for Surgery: Do Not take on the morning of surgery     buPROPion  mg 24 hr tablet  Commonly known as: Wellbutrin XL  Take 1 tablet (300 mg) by mouth once daily.  Medication Adjustments for Surgery: Do Not take on the morning of surgery     Caltrate with Vitamin D3 600 mg-20 mcg (800 unit) tablet  Generic drug: calcium carbonate-vitamin D3  Medication Adjustments for Surgery: Do Not take on the morning of surgery     cetirizine 5 mg tablet  Commonly known as: ZyrTEC  TAKE 1 TABLET BY MOUTH ONCE DAILY IN THE MORNING  Medication Adjustments for Surgery: Do Not take on the morning of surgery     citalopram 10 mg tablet  Commonly known as: CeleXA  Take 1 tablet (10 mg) by mouth once daily.  Medication Adjustments for Surgery: Do Not take on the morning of surgery     Daily Multi-Vitamin tablet  Generic drug: multivitamin  Medication Adjustments for Surgery:  Do Not take on the morning of surgery     famotidine 40 mg tablet  Commonly known as: Pepcid  TAKE 1 TABLET BY MOUTH AT BEDTIME  Medication Adjustments for Surgery: Do Not take on the morning of surgery     fluticasone 50 mcg/actuation nasal spray  Commonly known as: Flonase  Administer 1 spray into each nostril 2 times a day.  Medication Adjustments for Surgery: Do Not take on the morning of surgery     levothyroxine 50 mcg tablet  Commonly known as: Synthroid, Levoxyl  Take 1 tablet (50 mcg) by mouth once daily.  Medication Adjustments for Surgery: Do Not take on the morning of surgery     lisinopril 10 mg tablet  Take 1 tablet (10 mg) by mouth once daily.  Medication Adjustments for Surgery: Do Not take on the morning of surgery     montelukast 10 mg tablet  Commonly known as: Singulair  Take 1 tablet (10 mg) by mouth once daily at bedtime.  Medication Adjustments for Surgery: Do Not take on the morning of surgery     omeprazole 20 mg DR capsule  Commonly known as: PriLOSEC  Take 1 capsule (20 mg) by mouth once daily.  Medication Adjustments for Surgery: Do Not take on the morning of surgery     rizatriptan 10 mg tablet  Commonly known as: Maxalt  Medication Adjustments for Surgery: Do Not take on the morning of surgery     rOPINIRole 3 mg tablet  Commonly known as: Requip  Take 1 tablet (3 mg) by mouth 3 times a day.  Medication Adjustments for Surgery: Do Not take on the morning of surgery     traZODone 50 mg tablet  Commonly known as: Desyrel  Take 1 tablet (50 mg) by mouth once daily at bedtime.  Medication Adjustments for Surgery: Do Not take on the morning of surgery           * This list has 2 medication(s) that are the same as other medications prescribed for you. Read the directions carefully, and ask your doctor or other care provider to review them with you.                    Pre Surgical Instructions:    Do not drink any liquid after midnight the night before your surgery  Do not eat any food after  midnight the night before your surgery/procedure.  Candy, gum, mints and smoking of cigarettes, marijuana or vaping is not permitted after midnight prior to your surgery   Do not drink Alcohol 24 hours prior to surgery      Increase fluid intake day before surgery    Additional Instructions:      Review your medication instructions, take indicated medications    Wear  comfortable loose fitting clothing  Do not use moisturizers, creams, lotions or perfume  All jewelry and valuables should be left at home. May bring glasses and partials.    Stop blood thinning medications as instructed by ordering physician or surgeon    Shower or bathe the night before or day of surgery.   Brush teeth and avoid perfumes, colognes, powders, makeup, aftershave and hair spray    Go to Registration, in the main lobby, upon arrival on the day of surgery and have 's license and medical insurance card available.    Call [phone] the day before your surgery/procedure to find out what time you are to arrive the next day.     Please have a responsible adult to drive you home and be available to help you as needed after surgery.        Deep Breathing Exercises after surgery:   Breathe deeply using your lungs as fully as possible to move   secretions and clear lungs more easily.  Do a cycle of five deep breaths every hour.      Start by placing your hands on your ribs and take a deep breath in through your nose, expanding your lower chest.  You should feel your ribs push against your hands.  Breathe out slowly through your mouth until all the air is gone.    For every other breath, hold your breath for three seconds.  This will help keep the lungs fully open.     Coughing : Coughing is necessary to clear secretions that may accumulate in your lungs.  This should be done after breathing exercises.    If lying down, bend your knees and support you incision with a pillow or your hands to make it more comfortable.    If sitting, support  your incision, lean forward and keep your feet on the floor.  After your five deep breaths, breathe in and cough out sharply.  Repeat this cycle twice or for as long as you have secretions to clear.  ( You will not put your incision at risk by coughing.)    Leg Exercises:  Leg exercises are important to maintain good blood circulation in your legs, maintain muscle strength and prevent joint stiffness.  Do each exercise for 5 repetitions every hour while awake for the first few days or until you are up and walking around.         A. Pump your feet up and down at the ankles        B. With your legs straight, make circles with your feet.        C. Bend and straighten your knees by sliding your heels up and down the bed.         D. With your legs straight tighten the muscle above your knee and push the back of your knee down             Into the bed.  Hold for five seconds and then relax.  Alternate legs.

## 2025-05-04 ENCOUNTER — ANESTHESIA EVENT (OUTPATIENT)
Dept: OPERATING ROOM | Facility: HOSPITAL | Age: 75
End: 2025-05-04
Payer: MEDICARE

## 2025-05-06 ENCOUNTER — HOSPITAL ENCOUNTER (OUTPATIENT)
Facility: HOSPITAL | Age: 75
Setting detail: OUTPATIENT SURGERY
Discharge: HOME | End: 2025-05-06
Attending: SURGERY | Admitting: SURGERY
Payer: MEDICARE

## 2025-05-06 ENCOUNTER — PHARMACY VISIT (OUTPATIENT)
Dept: PHARMACY | Facility: CLINIC | Age: 75
End: 2025-05-06
Payer: COMMERCIAL

## 2025-05-06 ENCOUNTER — ANESTHESIA (OUTPATIENT)
Dept: OPERATING ROOM | Facility: HOSPITAL | Age: 75
End: 2025-05-06
Payer: MEDICARE

## 2025-05-06 VITALS
OXYGEN SATURATION: 94 % | TEMPERATURE: 97.6 F | SYSTOLIC BLOOD PRESSURE: 118 MMHG | BODY MASS INDEX: 29.77 KG/M2 | HEIGHT: 63 IN | HEART RATE: 73 BPM | RESPIRATION RATE: 10 BRPM | WEIGHT: 168 LBS | DIASTOLIC BLOOD PRESSURE: 54 MMHG

## 2025-05-06 DIAGNOSIS — K40.90 INGUINAL HERNIA WITHOUT OBSTRUCTION OR GANGRENE, RECURRENCE NOT SPECIFIED, UNSPECIFIED LATERALITY: Primary | ICD-10-CM

## 2025-05-06 PROCEDURE — RXMED WILLOW AMBULATORY MEDICATION CHARGE

## 2025-05-06 PROCEDURE — 3700000002 HC GENERAL ANESTHESIA TIME - EACH INCREMENTAL 1 MINUTE: Performed by: SURGERY

## 2025-05-06 PROCEDURE — 7100000001 HC RECOVERY ROOM TIME - INITIAL BASE CHARGE: Performed by: SURGERY

## 2025-05-06 PROCEDURE — 2780000003 HC OR 278 NO HCPCS: Performed by: SURGERY

## 2025-05-06 PROCEDURE — 2500000002 HC RX 250 W HCPCS SELF ADMINISTERED DRUGS (ALT 637 FOR MEDICARE OP, ALT 636 FOR OP/ED): Performed by: ANESTHESIOLOGY

## 2025-05-06 PROCEDURE — 7100000002 HC RECOVERY ROOM TIME - EACH INCREMENTAL 1 MINUTE: Performed by: SURGERY

## 2025-05-06 PROCEDURE — 49505 PRP I/HERN INIT REDUC >5 YR: CPT | Performed by: SURGERY

## 2025-05-06 PROCEDURE — 2500000001 HC RX 250 WO HCPCS SELF ADMINISTERED DRUGS (ALT 637 FOR MEDICARE OP): Performed by: ANESTHESIOLOGY

## 2025-05-06 PROCEDURE — 2720000007 HC OR 272 NO HCPCS: Performed by: SURGERY

## 2025-05-06 PROCEDURE — 3600000003 HC OR TIME - INITIAL BASE CHARGE - PROCEDURE LEVEL THREE: Performed by: SURGERY

## 2025-05-06 PROCEDURE — 2500000004 HC RX 250 GENERAL PHARMACY W/ HCPCS (ALT 636 FOR OP/ED): Performed by: SURGERY

## 2025-05-06 PROCEDURE — 7100000010 HC PHASE TWO TIME - EACH INCREMENTAL 1 MINUTE: Performed by: SURGERY

## 2025-05-06 PROCEDURE — C1781 MESH (IMPLANTABLE): HCPCS | Performed by: SURGERY

## 2025-05-06 PROCEDURE — 96372 THER/PROPH/DIAG INJ SC/IM: CPT | Performed by: SURGERY

## 2025-05-06 PROCEDURE — 2500000005 HC RX 250 GENERAL PHARMACY W/O HCPCS: Performed by: NURSE ANESTHETIST, CERTIFIED REGISTERED

## 2025-05-06 PROCEDURE — 2500000004 HC RX 250 GENERAL PHARMACY W/ HCPCS (ALT 636 FOR OP/ED): Mod: JZ | Performed by: NURSE ANESTHETIST, CERTIFIED REGISTERED

## 2025-05-06 PROCEDURE — 3700000001 HC GENERAL ANESTHESIA TIME - INITIAL BASE CHARGE: Performed by: SURGERY

## 2025-05-06 PROCEDURE — 3600000008 HC OR TIME - EACH INCREMENTAL 1 MINUTE - PROCEDURE LEVEL THREE: Performed by: SURGERY

## 2025-05-06 PROCEDURE — 2500000004 HC RX 250 GENERAL PHARMACY W/ HCPCS (ALT 636 FOR OP/ED): Mod: JZ | Performed by: ANESTHESIOLOGY

## 2025-05-06 PROCEDURE — 2500000004 HC RX 250 GENERAL PHARMACY W/ HCPCS (ALT 636 FOR OP/ED): Mod: JZ | Performed by: SURGERY

## 2025-05-06 PROCEDURE — 7100000009 HC PHASE TWO TIME - INITIAL BASE CHARGE: Performed by: SURGERY

## 2025-05-06 DEVICE — BARD SOFT MESH, 6" X 6" (15 CM X 15 CM)
Type: IMPLANTABLE DEVICE | Site: GROIN | Status: FUNCTIONAL
Brand: BARD

## 2025-05-06 RX ORDER — LIDOCAINE HYDROCHLORIDE 20 MG/ML
INJECTION, SOLUTION EPIDURAL; INFILTRATION; INTRACAUDAL; PERINEURAL AS NEEDED
Status: DISCONTINUED | OUTPATIENT
Start: 2025-05-06 | End: 2025-05-06

## 2025-05-06 RX ORDER — ROCURONIUM BROMIDE 10 MG/ML
INJECTION, SOLUTION INTRAVENOUS AS NEEDED
Status: DISCONTINUED | OUTPATIENT
Start: 2025-05-06 | End: 2025-05-06

## 2025-05-06 RX ORDER — ONDANSETRON HYDROCHLORIDE 2 MG/ML
INJECTION, SOLUTION INTRAVENOUS AS NEEDED
Status: DISCONTINUED | OUTPATIENT
Start: 2025-05-06 | End: 2025-05-06

## 2025-05-06 RX ORDER — FAMOTIDINE 10 MG/ML
20 INJECTION, SOLUTION INTRAVENOUS ONCE
Status: COMPLETED | OUTPATIENT
Start: 2025-05-06 | End: 2025-05-06

## 2025-05-06 RX ORDER — CEFAZOLIN SODIUM 2 G/50ML
2 SOLUTION INTRAVENOUS ONCE
Status: COMPLETED | OUTPATIENT
Start: 2025-05-06 | End: 2025-05-06

## 2025-05-06 RX ORDER — PROPOFOL 10 MG/ML
INJECTION, EMULSION INTRAVENOUS AS NEEDED
Status: DISCONTINUED | OUTPATIENT
Start: 2025-05-06 | End: 2025-05-06

## 2025-05-06 RX ORDER — IPRATROPIUM BROMIDE AND ALBUTEROL SULFATE 2.5; .5 MG/3ML; MG/3ML
3 SOLUTION RESPIRATORY (INHALATION) ONCE
Status: COMPLETED | OUTPATIENT
Start: 2025-05-06 | End: 2025-05-06

## 2025-05-06 RX ORDER — SODIUM CHLORIDE, SODIUM LACTATE, POTASSIUM CHLORIDE, CALCIUM CHLORIDE 600; 310; 30; 20 MG/100ML; MG/100ML; MG/100ML; MG/100ML
INJECTION, SOLUTION INTRAVENOUS CONTINUOUS PRN
Status: DISCONTINUED | OUTPATIENT
Start: 2025-05-06 | End: 2025-05-06

## 2025-05-06 RX ORDER — ONDANSETRON HYDROCHLORIDE 2 MG/ML
4 INJECTION, SOLUTION INTRAVENOUS ONCE AS NEEDED
Status: DISCONTINUED | OUTPATIENT
Start: 2025-05-06 | End: 2025-05-06 | Stop reason: HOSPADM

## 2025-05-06 RX ORDER — BUPIVACAINE HYDROCHLORIDE 2.5 MG/ML
INJECTION, SOLUTION EPIDURAL; INFILTRATION; INTRACAUDAL; PERINEURAL AS NEEDED
Status: DISCONTINUED | OUTPATIENT
Start: 2025-05-06 | End: 2025-05-06 | Stop reason: HOSPADM

## 2025-05-06 RX ORDER — SODIUM CITRATE AND CITRIC ACID MONOHYDRATE 334; 500 MG/5ML; MG/5ML
30 SOLUTION ORAL ONCE
Status: COMPLETED | OUTPATIENT
Start: 2025-05-06 | End: 2025-05-06

## 2025-05-06 RX ORDER — MORPHINE SULFATE 2 MG/ML
2 INJECTION, SOLUTION INTRAMUSCULAR; INTRAVENOUS EVERY 5 MIN PRN
Status: DISCONTINUED | OUTPATIENT
Start: 2025-05-06 | End: 2025-05-06 | Stop reason: HOSPADM

## 2025-05-06 RX ORDER — FENTANYL CITRATE 50 UG/ML
INJECTION, SOLUTION INTRAMUSCULAR; INTRAVENOUS AS NEEDED
Status: DISCONTINUED | OUTPATIENT
Start: 2025-05-06 | End: 2025-05-06

## 2025-05-06 RX ORDER — SODIUM CHLORIDE 9 MG/ML
20 INJECTION, SOLUTION INTRAVENOUS CONTINUOUS
Status: DISCONTINUED | OUTPATIENT
Start: 2025-05-06 | End: 2025-05-06 | Stop reason: HOSPADM

## 2025-05-06 RX ORDER — HEPARIN SODIUM 5000 [USP'U]/ML
5000 INJECTION, SOLUTION INTRAVENOUS; SUBCUTANEOUS ONCE
Status: COMPLETED | OUTPATIENT
Start: 2025-05-06 | End: 2025-05-06

## 2025-05-06 RX ORDER — POLYETHYLENE GLYCOL 3350 17 G/17G
17 POWDER, FOR SOLUTION ORAL DAILY
Qty: 238 G | Refills: 0 | Status: SHIPPED | OUTPATIENT
Start: 2025-05-06 | End: 2025-07-05

## 2025-05-06 RX ORDER — MORPHINE SULFATE 4 MG/ML
4 INJECTION INTRAVENOUS EVERY 5 MIN PRN
Status: DISCONTINUED | OUTPATIENT
Start: 2025-05-06 | End: 2025-05-06 | Stop reason: HOSPADM

## 2025-05-06 RX ORDER — NALOXONE HYDROCHLORIDE 4 MG/.1ML
SPRAY NASAL
Qty: 2 EACH | Refills: 0 | OUTPATIENT
Start: 2025-05-06 | End: 2025-05-06 | Stop reason: HOSPADM

## 2025-05-06 RX ORDER — OXYCODONE AND ACETAMINOPHEN 5; 325 MG/1; MG/1
1 TABLET ORAL EVERY 6 HOURS PRN
Qty: 10 TABLET | Refills: 0 | Status: SHIPPED | OUTPATIENT
Start: 2025-05-06

## 2025-05-06 RX ORDER — NORETHINDRONE AND ETHINYL ESTRADIOL 0.5-0.035
KIT ORAL AS NEEDED
Status: DISCONTINUED | OUTPATIENT
Start: 2025-05-06 | End: 2025-05-06

## 2025-05-06 RX ORDER — ONDANSETRON HYDROCHLORIDE 2 MG/ML
4 INJECTION, SOLUTION INTRAVENOUS ONCE
Status: COMPLETED | OUTPATIENT
Start: 2025-05-06 | End: 2025-05-06

## 2025-05-06 RX ADMIN — PROPOFOL 150 MG: 10 INJECTION, EMULSION INTRAVENOUS at 09:18

## 2025-05-06 RX ADMIN — FENTANYL CITRATE 100 MCG: 50 INJECTION INTRAMUSCULAR; INTRAVENOUS at 09:37

## 2025-05-06 RX ADMIN — FENTANYL CITRATE 50 MCG: 50 INJECTION INTRAMUSCULAR; INTRAVENOUS at 09:18

## 2025-05-06 RX ADMIN — CEFAZOLIN SODIUM 2 G: 2 SOLUTION INTRAVENOUS at 09:19

## 2025-05-06 RX ADMIN — SODIUM CHLORIDE, POTASSIUM CHLORIDE, SODIUM LACTATE AND CALCIUM CHLORIDE: 600; 310; 30; 20 INJECTION, SOLUTION INTRAVENOUS at 09:15

## 2025-05-06 RX ADMIN — LIDOCAINE HYDROCHLORIDE 100 MG: 20 INJECTION, SOLUTION EPIDURAL; INFILTRATION; INTRACAUDAL; PERINEURAL at 09:18

## 2025-05-06 RX ADMIN — FAMOTIDINE 20 MG: 10 INJECTION, SOLUTION INTRAVENOUS at 08:27

## 2025-05-06 RX ADMIN — IPRATROPIUM BROMIDE AND ALBUTEROL SULFATE 3 ML: 2.5; .5 SOLUTION RESPIRATORY (INHALATION) at 08:27

## 2025-05-06 RX ADMIN — IPRATROPIUM BROMIDE AND ALBUTEROL SULFATE 3 ML: 2.5; .5 SOLUTION RESPIRATORY (INHALATION) at 11:54

## 2025-05-06 RX ADMIN — EPHEDRINE SULFATE 10 MG: 50 INJECTION, SOLUTION INTRAVENOUS at 09:42

## 2025-05-06 RX ADMIN — ONDANSETRON 4 MG: 2 INJECTION INTRAMUSCULAR; INTRAVENOUS at 10:14

## 2025-05-06 RX ADMIN — PROPOFOL 50 MG: 10 INJECTION, EMULSION INTRAVENOUS at 09:40

## 2025-05-06 RX ADMIN — HEPARIN SODIUM 5000 UNITS: 5000 INJECTION, SOLUTION INTRAVENOUS; SUBCUTANEOUS at 08:27

## 2025-05-06 RX ADMIN — ROCURONIUM BROMIDE 10 MG: 10 INJECTION, SOLUTION INTRAVENOUS at 09:39

## 2025-05-06 RX ADMIN — EPHEDRINE SULFATE 10 MG: 50 INJECTION, SOLUTION INTRAVENOUS at 10:11

## 2025-05-06 RX ADMIN — FENTANYL CITRATE 50 MCG: 50 INJECTION INTRAMUSCULAR; INTRAVENOUS at 09:33

## 2025-05-06 RX ADMIN — EPHEDRINE SULFATE 15 MG: 50 INJECTION, SOLUTION INTRAVENOUS at 09:45

## 2025-05-06 RX ADMIN — DEXAMETHASONE SODIUM PHOSPHATE 4 MG: 4 INJECTION, SOLUTION INTRAMUSCULAR; INTRAVENOUS at 09:25

## 2025-05-06 RX ADMIN — MORPHINE SULFATE 4 MG: 4 INJECTION INTRAVENOUS at 10:50

## 2025-05-06 RX ADMIN — ONDANSETRON 4 MG: 2 INJECTION INTRAMUSCULAR; INTRAVENOUS at 08:27

## 2025-05-06 RX ADMIN — SODIUM CITRATE AND CITRIC ACID MONOHYDRATE 30 ML: 500; 334 SOLUTION ORAL at 08:27

## 2025-05-06 SDOH — HEALTH STABILITY: MENTAL HEALTH: CURRENT SMOKER: 0

## 2025-05-06 ASSESSMENT — PAIN SCALES - GENERAL
PAINLEVEL_OUTOF10: 0 - NO PAIN
PAINLEVEL_OUTOF10: 4
PAINLEVEL_OUTOF10: 0 - NO PAIN
PAINLEVEL_OUTOF10: 0 - NO PAIN
PAIN_LEVEL: 0
PAINLEVEL_OUTOF10: 3
PAINLEVEL_OUTOF10: 5 - MODERATE PAIN
PAINLEVEL_OUTOF10: 0 - NO PAIN
PAINLEVEL_OUTOF10: 8
PAINLEVEL_OUTOF10: 0 - NO PAIN

## 2025-05-06 ASSESSMENT — COLUMBIA-SUICIDE SEVERITY RATING SCALE - C-SSRS
2. HAVE YOU ACTUALLY HAD ANY THOUGHTS OF KILLING YOURSELF?: NO
6. HAVE YOU EVER DONE ANYTHING, STARTED TO DO ANYTHING, OR PREPARED TO DO ANYTHING TO END YOUR LIFE?: NO
1. IN THE PAST MONTH, HAVE YOU WISHED YOU WERE DEAD OR WISHED YOU COULD GO TO SLEEP AND NOT WAKE UP?: NO

## 2025-05-06 ASSESSMENT — PAIN DESCRIPTION - DESCRIPTORS: DESCRIPTORS: SHARP

## 2025-05-06 NOTE — ANESTHESIA PREPROCEDURE EVALUATION
Patient: Magda Paz    Procedure Information       Date/Time: 05/06/25 0915    Procedure: Open repair of left inguinal hernia with mesh (Left: Groin) - 1 HOUR    Location: [facility] OR 01 / [facility]    Surgeons: Karla Pedro MD            Relevant Problems   Cardiac   (+) Benign essential hypertension   (+) Mixed hyperlipidemia      Pulmonary   (+) COPD (chronic obstructive pulmonary disease) (Multi)      Neuro   (+) Anxiety and depression   (+) Carpal tunnel syndrome   (+) Depressive disorder      GI   (+) GERD (gastroesophageal reflux disease)      Endocrine   (+) Hypothyroidism      Musculoskeletal   (+) Carpal tunnel syndrome   (+) Cervical stenosis of spinal canal   (+) Generalized osteoarthritis      Skin   (+) Eczema       Clinical information reviewed:   Tobacco  Allergies  Meds  Problems  Med Hx  Surg Hx  OB Status    Fam Hx  Soc Hx        NPO Detail:  NPO/Void Status  Date of Last Liquid: 05/05/25  Time of Last Liquid: 2200  Date of Last Solid: 05/05/25  Time of Last Solid: 1800         Physical Exam    Airway  Mallampati: II  TM distance: >3 FB  Neck ROM: full  Mouth opening: 3 or more finger widths     Cardiovascular - normal exam   Dental     (+) upper dentures, lower dentures     Pulmonary - normal exam   Abdominal - normal exam           Anesthesia Plan    History of general anesthesia?: yes  History of complications of general anesthesia?: no    ASA 3     general     The patient is not a current smoker.    intravenous induction   Postoperative pain plan includes opioids.  Anesthetic plan and risks discussed with patient.    Plan discussed with CRNA.

## 2025-05-06 NOTE — OP NOTE
Open repair of left inguinal hernia with mesh (L) Operative Note     Date: 2025  OR Location: [facility]    Name: Magda Paz, : 1950, Age: 74 y.o., MRN: [id number], Sex: female    Diagnosis  Pre-op Diagnosis      * Inguinal hernia without obstruction or gangrene, recurrence not specified, unspecified laterality [K40.90] Post-op Diagnosis     * Inguinal hernia without obstruction or gangrene, recurrence not specified, unspecified laterality [K40.90]     Procedures  Open repair of left inguinal hernia with mesh  74436 - VT RPR 1ST INGUN HRNA AGE 5 YRS/> REDUCIBLE      Surgeons      * Karla Pedro - Primary    Resident/Fellow/Other Assistant:  Surgeons and Role:  * No surgeons found with a matching role *  PILAR Pierre    Staff:   Circulator: Jennifer Lama Person: Shelby  Surgical Assistant: Fiona Ferguson Circulator: Kasia Ferguson Scrub: Alethea    Anesthesia Staff: CRNA: CHELSEA Waller-CRNA    Procedure Summary  Anesthesia: General  ASA: III  Estimated Blood Loss: 5mL  Intra-op Medications:   Administrations occurring from 0915 to 1045 on 25:   Medication Name Total Dose   bupivacaine PF 0.25 % (Marcaine) 0.25 % (2.5 mg/mL) injection 30 mL   dexAMETHasone (Decadron) 4 mg/mL IV Syringe 2 mL 4 mg   dexmedeTOMIDine (Precedex) bolus from bag 10 mcg   ePHEDrine 50 mg/mL 35 mg   fentaNYL (Sublimaze) injection 50 mcg/mL 200 mcg   LR infusion Cannot be calculated   lidocaine PF (Xylocaine-MPF) local injection 2 % 100 mg   ondansetron (Zofran) 2 mg/mL injection 4 mg   propofol (Diprivan) injection 10 mg/mL 150 mg   rocuronium 10 mg/mL 10 mg   ceFAZolin (Ancef) 2 g in dextrose (iso) IV 50 mL 2 g              Anesthesia Record               Intraprocedure I/O Totals          Intake    Dexmedetomidine 0.00 mL    The total shown is the total volume documented since Anesthesia Start was filed.    LR infusion 800.00 mL    Total Intake 800 mL          Specimen: No specimens collected      Implants:  Implants       Type Name Action Serial No.      Surgical Mesh Sling Implant MESH, SOFTMESH 6 X 6 - [id number] Implanted             Findings: indirect inguinal hernia on the left containing fat    Indications: Magda Paz is an 74 y.o. female who is having surgery for Inguinal hernia without obstruction or gangrene, recurrence not specified, unspecified laterality [K40.90].     The patient was seen in the preoperative area. The risks, benefits, complications, treatment options, non-operative alternatives, expected recovery and outcomes were discussed with the patient. The possibilities of reaction to medication, pulmonary aspiration, injury to surrounding structures, bleeding, recurrent infection, the need for additional procedures, failure to diagnose a condition, and creating a complication requiring transfusion or operation were discussed with the patient. The patient concurred with the proposed plan, giving informed consent.  The site of surgery was properly noted/marked if necessary per policy. The patient has been actively warmed in preoperative area. Preoperative antibiotics have been ordered and given within 1 hours of incision. Venous thrombosis prophylaxis have been ordered including bilateral sequential compression devices and chemical prophylaxis    Procedure Details:   The patient was taken to the operating room and underwent general anesthesia. Sequential compression devices were placed. Subcutaneous heparin and appropriate antibiotics were administered. The lower abdomen was prepped and draped in the usual sterile fashion. A time-out was performed with all parties present.     In the left inguinal region, a transverse incision was made. Blunt dissection and electrocautery were used to dissect through the subcutaneous fat down to the external oblique aponeurosis. The aponeurosis was sharply entered along its length, opening the external inguinal ring. These flaps were elevated and  the underlying fatty tissue was dissected free. The patient was noted to have an indirect inguinal hernia containing reducible fat. The herniated fatty tissue was dissected free from the inguinal space. The ligament was not visualized. The ilioinguinal nerve was ligated. Once the hernia sac was reduced, 2-0 vicryl sutures were placed in a figure of 8 fashion to keep the tissue reduced. A piece of medium-weight Bard polypropylene soft mesh was cut to size and placed into the inguinal space. Interrupted 2-0 prolene sutures were used to secure the mesh at the pubic tubercle, shelving edge of the inguinal ligament, and the conjoint tendon. The tail of the mesh was carefully placed under the aponeurosis and the mesh was noted to lay flat, covering all of the potential hernia spaces. Local anesthesia was administered using 0.25% Marcaine. The aponeurosis was closed using 2-0 Vicryl in a running fashion, recreating the external ring. Azra’s fascia was closed using 3-0 Vicryl in an interrupted fashion. The skin was closed using 4-0 in a running fashion. The incision was sealed with Liquiband.    The patient was extubated and taken to recovery in stable condition. All needle, sponge and instrument counts were correct at the end of the case. I was present for the entire case.     Evidence of Infection: No   Complications:  None; patient tolerated the procedure well.    Disposition: PACU - hemodynamically stable.  Condition: stable     Task Performed by RNFA or Surgical Assistant:  Retraction, skin closure    Attending Attestation: I was present and scrubbed for the entire procedure.    Karla Pedro  Phone Number: [phone]

## 2025-05-06 NOTE — ANESTHESIA PROCEDURE NOTES
Airway  Date/Time: 5/6/2025 9:21 AM  Reason: elective    Airway not difficult    Staffing  Performed: CRNA   Authorized by: JONES Waller    Performed by: JONES Waller  Patient location during procedure: OR    Patient Condition  Indications for airway management: anesthesia  Patient position: sniffing  MILS maintained throughout  No planned trial extubation  Sedation level: deep     Final Airway Details   Preoxygenated: yes  Final airway type: supraglottic airway  Successful airway: Supreme  Size: 3

## 2025-05-06 NOTE — DISCHARGE INSTRUCTIONS
Discharge Instructions    Incisions  Your incisions are closed with skin glue. Do not remove the glue - it will fall off on its own in 1-2 weeks.  You may take a shower or bath starting this evening.   Keep the incisions clean and dry.   You may (but do not have to) cover the incisions with gauze and tape.     Pain  Do not drive while taking stronger pain medications (Tramadol, Percocet, Norco, Oxycodone, etc).   You may drive once you feel comfortable without stronger pain medications and can drive without any significant distractions related to your pain or surgical sites.  You may alternate acetaminophen and ibuprofen for pain control, as long as you are able to take either medication.     Diet  Resume your normal diet. Your appetite may not fully return immediately.   Please drink plenty of fluids to maintain hydration.    Physical Activity  Limit physical activity that would involve stretching the incision(s) for 2 weeks.  No lifting over 10-15 lb or strenuous physical activity for at least 3 weeks after surgery. If there is pain with increased physical effort, please resume light duty restrictions.   If you need FMLA, short-term disability, or other paperwork completed, please provide the paperwork or instructions to the office in person or fax to [phone].    Follow Up  Follow up with Dr. Karla Pedro in the office per the instructions above. Please call the office to follow up sooner if there are concerns you would like to discuss.    Call Provider  If you are experiencing fever (100.4F or higher).  If you are experiencing significantly worsening pain, nausea or vomiting.  If the incision(s) appear reddened, swollen or are draining.  If you have very loose or watery bowel movements.  If you have any new concerning symptoms.

## 2025-05-06 NOTE — ANESTHESIA POSTPROCEDURE EVALUATION
Patient: Magda Paz    Procedure Summary       Date: 05/06/25 Room / Location: [facility] 01 / [facility]    Anesthesia Start: 0916 Anesthesia Stop: 1044    Procedure: Open repair of left inguinal hernia with mesh (Left: Groin) Diagnosis:       Inguinal hernia without obstruction or gangrene, recurrence not specified, unspecified laterality      (Inguinal hernia without obstruction or gangrene, recurrence not specified, unspecified laterality [K40.90])    Surgeons: Karla Pedro MD Responsible Provider: JONES Waller    Anesthesia Type: general ASA Status: 3            Anesthesia Type: general    Vitals Value Taken Time   /54 05/06/25 12:10   Temp 36.4 °C (97.6 °F) 05/06/25 11:40   Pulse 74 05/06/25 12:14   Resp 13 05/06/25 12:14   SpO2 95 % 05/06/25 12:14   Vitals shown include unfiled device data.    Anesthesia Post Evaluation    Patient location during evaluation: PACU  Patient participation: complete - patient participated  Level of consciousness: awake and alert  Pain score: 0  Pain management: adequate  Airway patency: patent  Cardiovascular status: hemodynamically stable  Respiratory status: room air  Hydration status: acceptable  Postoperative Nausea and Vomiting: none  Comments: DUONEB Tx GIVEN PRIOR TO DISCHARGE.  PT ON PRN O2 AT HOME.  INSTRUCTED PT TO USE HER O2 ON HOME ARRIVAL        There were no known notable events for this encounter.

## 2025-05-13 ENCOUNTER — OFFICE VISIT (OUTPATIENT)
Dept: URGENT CARE | Age: 75
End: 2025-05-13
Payer: MEDICARE

## 2025-05-13 VITALS
TEMPERATURE: 98.9 F | OXYGEN SATURATION: 94 % | DIASTOLIC BLOOD PRESSURE: 55 MMHG | SYSTOLIC BLOOD PRESSURE: 131 MMHG | HEART RATE: 87 BPM

## 2025-05-13 DIAGNOSIS — L03.116 CELLULITIS OF LEFT LOWER EXTREMITY: Primary | ICD-10-CM

## 2025-05-13 DIAGNOSIS — R21 RASH: ICD-10-CM

## 2025-05-13 PROCEDURE — 99203 OFFICE O/P NEW LOW 30 MIN: CPT | Performed by: PHYSICIAN ASSISTANT

## 2025-05-13 PROCEDURE — 3078F DIAST BP <80 MM HG: CPT | Performed by: PHYSICIAN ASSISTANT

## 2025-05-13 PROCEDURE — 3075F SYST BP GE 130 - 139MM HG: CPT | Performed by: PHYSICIAN ASSISTANT

## 2025-05-13 RX ORDER — CEPHALEXIN 500 MG/1
500 CAPSULE ORAL 2 TIMES DAILY
Qty: 14 CAPSULE | Refills: 0 | Status: SHIPPED | OUTPATIENT
Start: 2025-05-13 | End: 2025-05-20

## 2025-05-13 ASSESSMENT — ENCOUNTER SYMPTOMS
CHILLS: 0
FEVER: 0

## 2025-05-13 NOTE — PROGRESS NOTES
Subjective   Patient ID: Magda Paz is a 74 y.o. female. They present today with a chief complaint of Rash (Pt is complaining of rash on arms and legs).    History of Present Illness  Patient presents for evaluation of left lower leg pain and rash.  She states that she developed a brown spot on her left foot after being in the grass about 2 weeks ago.  She then developed a rash widespread.  She states she is having a lot of pain and oozing from her left leg.  Denies any fevers or chills.  She states it did seem to dry up and improve when she had IV antibiotics as she had recent hernia surgery last week.  Patient has been applying topical antibiotic cream for this.  Patient states she does have known psoriasis and thinks that the rash on her upper extremities is her psoriasis.      Rash  Pertinent negatives include no fever.       Past Medical History  Allergies as of 05/13/2025 - Reviewed 05/06/2025   Allergen Reaction Noted    Metoclopramide hcl Palpitations, Shortness of breath, and Other 09/01/2023    Sulfa (sulfonamide antibiotics) Other 10/23/2023    Sulfasalazine Anaphylaxis and Other 09/01/2023    Meloxicam Rash 09/01/2023    Sulfamethoxazole Other 09/01/2023       Prescriptions Prior to Admission[1]     Medical History[2]    Surgical History[3]     reports that she quit smoking about 10 years ago. Her smoking use included cigarettes. She has a 22.5 pack-year smoking history. She has never used smokeless tobacco. She reports that she does not drink alcohol and does not use drugs.    Review of Systems  Review of Systems   Constitutional:  Negative for chills and fever.   Skin:  Positive for rash.        Positive for warmth, redness and drainage.                                  Objective    Vitals:    05/13/25 1750   BP: 131/55   Pulse: 87   Temp: 37.2 °C (98.9 °F)   SpO2: 94%     No LMP recorded. Patient is postmenopausal.    Physical Exam  Vitals reviewed.   Constitutional:       General: She is not in  acute distress.     Appearance: She is not ill-appearing.   Skin:     Comments: Left anterior lower leg displays wide spread redness, there is honey colored crusting discharge present throughout.  This area is tender and warm.  There is no fluctuance or abscess present.  She also has psoriatic rash scattered on bilateral upper and lower extremities.   Neurological:      Mental Status: She is alert.         Procedures    Point of Care Test & Imaging Results from this visit  No results found for this visit on 05/13/25.   Imaging  No results found.    Cardiology, Vascular, and Other Imaging  No other imaging results found for the past 2 days      Diagnostic study results (if any) were reviewed by Macie Vasquez PA-C.    Assessment/Plan   Allergies, medications, history, and pertinent labs/EKGs/Imaging reviewed by Macie Vasquez PA-C.     Medical Decision Making  MDM- Signs and symptoms consistent with cellulitis. No evidence of sepsis, abscess, or other complications. Plan is for antibiotic therapy and symptomatic support at home. Plan to follow with PCP, patient was also given a referral to dermatology for her psoriasis. Patient advised to return to clinic or go to the ED if symptoms change or worsen. Patient verbalized understanding and agrees with plan.      Orders and Diagnoses  Diagnoses and all orders for this visit:  Cellulitis of left lower extremity  -     cephalexin (Keflex) 500 mg capsule; Take 1 capsule (500 mg) by mouth 2 times a day for 7 days.  Rash  -     Referral to Dermatology      Medical Admin Record      Patient disposition: Home    Electronically signed by Macie Vasquez PA-C  6:01 PM           [1] (Not in a hospital admission)   [2]   Past Medical History:  Diagnosis Date    Allergic     Arthritis     Cancer (Multi)     Chronic kidney disease     Colon polyp 2022    COPD (chronic obstructive pulmonary disease) (Multi)     Coronary artery disease     Depression     Enterocolitis due to  Clostridium difficile, not specified as recurrent 07/01/2015    C. difficile colitis    GERD (gastroesophageal reflux disease)     History of transfusion 2016    Hyperlipidemia     Hypertension     Hypothyroidism     Lung disease 2023    Personal history of malignant neoplasm of bladder     History of malignant neoplasm of bladder    Personal history of other diseases of the musculoskeletal system and connective tissue 07/01/2015    History of arthritis    Personal history of other diseases of the musculoskeletal system and connective tissue 07/01/2015    History of osteoarthritis    Personal history of other diseases of the nervous system and sense organs 07/01/2015    History of migraine    Restless legs syndrome 07/01/2015    Restless legs syndrome    Sleep apnea    [3]   Past Surgical History:  Procedure Laterality Date    CT ANGIO NECK  12/01/2022    CT NECK ANGIO W AND WO IV CONTRAST 12/1/2022 [facility]    ELBOW SURGERY Left     nerve    OTHER SURGICAL HISTORY  05/29/2019    Dilation and curettage    OTHER SURGICAL HISTORY  05/29/2019    Cervical disc replacement    ROTATOR CUFF REPAIR      TONSILLECTOMY      TUBAL LIGATION

## 2025-05-19 ENCOUNTER — APPOINTMENT (OUTPATIENT)
Dept: SURGERY | Facility: CLINIC | Age: 75
End: 2025-05-19
Payer: MEDICARE

## 2025-05-19 VITALS
WEIGHT: 163 LBS | SYSTOLIC BLOOD PRESSURE: 124 MMHG | OXYGEN SATURATION: 90 % | HEART RATE: 72 BPM | BODY MASS INDEX: 28.88 KG/M2 | HEIGHT: 63 IN | DIASTOLIC BLOOD PRESSURE: 72 MMHG

## 2025-05-19 DIAGNOSIS — K40.90 INGUINAL HERNIA WITHOUT OBSTRUCTION OR GANGRENE, RECURRENCE NOT SPECIFIED, UNSPECIFIED LATERALITY: Primary | ICD-10-CM

## 2025-05-19 ASSESSMENT — ENCOUNTER SYMPTOMS
VOMITING: 0
FEVER: 0
ABDOMINAL PAIN: 0
BLOOD IN STOOL: 0
CONSTIPATION: 0
CHILLS: 0
DIZZINESS: 0
SHORTNESS OF BREATH: 0
NAUSEA: 0
HEADACHES: 0
DIARRHEA: 0
PALPITATIONS: 0
COUGH: 0

## 2025-05-19 NOTE — PROGRESS NOTES
GENERAL SURGERY CLINIC NOTE    Magda Paz   1950   [id number]     History Of Present Illness  Magda Paz is a 74 y.o. female who presents to the office for follow up after undergoing open repair of a left inguinal hernia with mesh 5/6/25. Her pain has subsided. She is tolerating a diet and having normal bowel function.     She has COPD and uses O2 PRN.     The patient lives with her son and her niece lives nearby.      Past Medical History  She has a past medical history of Allergic, Arthritis, Cancer (Multi), Chronic kidney disease, Colon polyp (2022), COPD (chronic obstructive pulmonary disease) (Multi), Coronary artery disease, Depression, Enterocolitis due to Clostridium difficile, not specified as recurrent (07/01/2015), GERD (gastroesophageal reflux disease), History of transfusion (2016), Hyperlipidemia, Hypertension, Hypothyroidism, Lung disease (2023), Personal history of malignant neoplasm of bladder, Personal history of other diseases of the musculoskeletal system and connective tissue (07/01/2015), Personal history of other diseases of the musculoskeletal system and connective tissue (07/01/2015), Personal history of other diseases of the nervous system and sense organs (07/01/2015), Restless legs syndrome (07/01/2015), and Sleep apnea.    She has no past medical history of Personal history of irradiation.    Surgical History  She has a past surgical history that includes Other surgical history (05/29/2019); Other surgical history (05/29/2019); CT angio neck (12/01/2022); Tubal ligation; Tonsillectomy; Rotator cuff repair; Elbow surgery (Left); and Hernia repair (Left, 05/06/2025).    Medications  Medications Ordered Prior to Encounter[1]    Allergies  Metoclopramide hcl, Sulfa (sulfonamide antibiotics), Sulfasalazine, Meloxicam, and Sulfamethoxazole     Social History  She reports that she quit smoking about 10 years ago. Her smoking use included cigarettes. She has a 22.5 pack-year smoking  "history. She has never used smokeless tobacco. She reports that she does not drink alcohol and does not use drugs.    Family History  Family History[2]     Review of Systems   Constitutional:  Negative for chills and fever.   Respiratory:  Negative for cough and shortness of breath.    Cardiovascular:  Negative for chest pain and palpitations.   Gastrointestinal:  Negative for abdominal pain, blood in stool, constipation, diarrhea, nausea and vomiting.   Neurological:  Negative for dizziness and headaches.   All other systems reviewed and are negative.      Last Recorded Vitals  Blood pressure 124/72, pulse 72, height 1.6 m (5' 3\"), weight 73.9 kg (163 lb), SpO2 90%.     Physical Exam  Constitutional:       General: She is not in acute distress.     Appearance: Normal appearance. She is not ill-appearing.   HENT:      Head: Normocephalic and atraumatic.   Cardiovascular:      Rate and Rhythm: Normal rate and regular rhythm.   Pulmonary:      Effort: Pulmonary effort is normal. No respiratory distress.      Breath sounds: Normal breath sounds.   Abdominal:      General: There is no distension.      Palpations: Abdomen is soft.      Tenderness: There is no abdominal tenderness. There is no guarding.      Hernia: No hernia is present.      Comments: L inguinal incision healing well with some swelling medially   Musculoskeletal:         General: No swelling.   Skin:     General: Skin is warm and dry.      Comments: LLE skin changes from chronic skin issues - reddened, skin split/cracked in several locations   Neurological:      Mental Status: She is alert and oriented to person, place, and time. Mental status is at baseline.   Psychiatric:         Mood and Affect: Mood normal.         Behavior: Behavior normal.          Relevant Results    CT A/P 4/1/25  IMPRESSION:  Left inguinal hernia containing predominantly fat and a small  circumscribed structure measuring 1.3 x 2.3 cm with internal  Hounsfield units equivocal for " fluid density. Findings may represent  small volume entrapped fluid although other etiologies are not  excluded. Recommend ultrasound evaluation for further  characterization.      Assessment and Plan  74 y.o. female with a reducible, symptomatic left inguinal hernia status post open repair with mesh 5/6/25. She is recovering well postoperatively. I asked her to continue lifting restrictions for another 1-2 weeks.     The skin of her left foot/lower leg appear worse than it did on the day of surgery, with increasing swelling and redness. I asked her if she would like a referral to Dermatology - she declined and will follow up with her PCP.     Follow up with me on an as needed basis. She expressed her understanding and all questions were answered.     Karla Pedro MD, [facility]  General Surgery        [1]   Current Outpatient Medications on File Prior to Visit   Medication Sig Dispense Refill    albuterol 2.5 mg /3 mL (0.083 %) nebulizer solution Take 3 mL (2.5 mg) by nebulization every 4 hours if needed for wheezing. 75 mL 3    albuterol 90 mcg/actuation inhaler Inhale 1-2 puffs every 4 hours if needed. ProAir      aspirin 81 mg EC tablet Take 1 tablet (81 mg) by mouth once daily.      atorvastatin (Lipitor) 40 mg tablet Take 1 tablet (40 mg) by mouth once daily at bedtime. 90 tablet 1    azelastine (Astelin) 137 mcg (0.1 %) nasal spray Administer 1 spray into each nostril 2 times a day. Use in each nostril as directed 30 mL 3    buPROPion XL (Wellbutrin XL) 300 mg 24 hr tablet Take 1 tablet (300 mg) by mouth once daily. 90 tablet 1    calcium carbonate-vitamin D3 (Caltrate with Vitamin D3) 600 mg-20 mcg (800 unit) tablet Take 1 tablet by mouth once daily.      cephalexin (Keflex) 500 mg capsule Take 1 capsule (500 mg) by mouth 2 times a day for 7 days. 14 capsule 0    cetirizine (ZyrTEC) 5 mg tablet TAKE 1 TABLET BY MOUTH ONCE DAILY IN THE MORNING 90 tablet 3    citalopram (CeleXA) 10 mg tablet Take 1 tablet (10 mg)  by mouth once daily. 90 tablet 1    famotidine (Pepcid) 40 mg tablet TAKE 1 TABLET BY MOUTH AT BEDTIME 30 tablet 4    fluticasone (Flonase) 50 mcg/actuation nasal spray Administer 1 spray into each nostril 2 times a day. 16 g 3    levothyroxine (Synthroid, Levoxyl) 50 mcg tablet Take 1 tablet (50 mcg) by mouth once daily. 90 tablet 1    lisinopril 10 mg tablet Take 1 tablet (10 mg) by mouth once daily. 90 tablet 2    montelukast (Singulair) 10 mg tablet Take 1 tablet (10 mg) by mouth once daily at bedtime. 90 tablet 1    multivitamin (Daily Multi-Vitamin) tablet Take 1 tablet by mouth once daily.      omeprazole (PriLOSEC) 20 mg DR capsule Take 1 capsule (20 mg) by mouth once daily. 90 capsule 1    oxyCODONE-acetaminophen (Percocet) 5-325 mg tablet Take 1 tablet by mouth every 6 hours if needed for severe pain (7 - 10). 10 tablet 0    polyethylene glycol (Miralax) 17 gram/dose powder Mix 17 g (1capful) in 6-8oz of liquid of powder and drink once daily. 238 g 0    rizatriptan (Maxalt) 10 mg tablet Take 1 tablet (10 mg) by mouth if needed.      rOPINIRole (Requip) 3 mg tablet Take 1 tablet (3 mg) by mouth 3 times a day. 270 tablet 1    traZODone (Desyrel) 50 mg tablet Take 1 tablet (50 mg) by mouth once daily at bedtime. 90 tablet 1    Anoro Ellipta 62.5-25 mcg/actuation blister with device Inhale 1 puff once daily. 3 each 3     No current facility-administered medications on file prior to visit.   [2]   Family History  Problem Relation Name Age of Onset    Cervical cancer Mother Lea Saha     Cancer Mother Lea Saha     Cancer Sister  50

## 2025-05-20 ENCOUNTER — APPOINTMENT (OUTPATIENT)
Dept: RADIOLOGY | Facility: HOSPITAL | Age: 75
End: 2025-05-20
Payer: MEDICARE

## 2025-05-20 ENCOUNTER — OFFICE VISIT (OUTPATIENT)
Dept: PRIMARY CARE | Facility: CLINIC | Age: 75
End: 2025-05-20
Payer: MEDICARE

## 2025-05-20 ENCOUNTER — HOSPITAL ENCOUNTER (EMERGENCY)
Facility: HOSPITAL | Age: 75
Discharge: HOME | End: 2025-05-20
Attending: EMERGENCY MEDICINE
Payer: MEDICARE

## 2025-05-20 VITALS
HEART RATE: 68 BPM | SYSTOLIC BLOOD PRESSURE: 110 MMHG | WEIGHT: 163.4 LBS | BODY MASS INDEX: 28.95 KG/M2 | DIASTOLIC BLOOD PRESSURE: 72 MMHG | TEMPERATURE: 97.6 F | OXYGEN SATURATION: 97 %

## 2025-05-20 VITALS
BODY MASS INDEX: 28.7 KG/M2 | HEIGHT: 63 IN | DIASTOLIC BLOOD PRESSURE: 70 MMHG | TEMPERATURE: 98.6 F | SYSTOLIC BLOOD PRESSURE: 142 MMHG | WEIGHT: 162 LBS | OXYGEN SATURATION: 98 % | HEART RATE: 78 BPM | RESPIRATION RATE: 20 BRPM

## 2025-05-20 DIAGNOSIS — L03.116 CELLULITIS OF LEFT LOWER EXTREMITY: Primary | ICD-10-CM

## 2025-05-20 DIAGNOSIS — R21 RASH: Primary | ICD-10-CM

## 2025-05-20 PROCEDURE — 1036F TOBACCO NON-USER: CPT | Performed by: FAMILY MEDICINE

## 2025-05-20 PROCEDURE — 3074F SYST BP LT 130 MM HG: CPT | Performed by: FAMILY MEDICINE

## 2025-05-20 PROCEDURE — 99284 EMERGENCY DEPT VISIT MOD MDM: CPT | Mod: 25 | Performed by: EMERGENCY MEDICINE

## 2025-05-20 PROCEDURE — 1159F MED LIST DOCD IN RCRD: CPT | Performed by: FAMILY MEDICINE

## 2025-05-20 PROCEDURE — 99213 OFFICE O/P EST LOW 20 MIN: CPT | Performed by: FAMILY MEDICINE

## 2025-05-20 PROCEDURE — 2500000001 HC RX 250 WO HCPCS SELF ADMINISTERED DRUGS (ALT 637 FOR MEDICARE OP): Performed by: NURSE PRACTITIONER

## 2025-05-20 PROCEDURE — 3078F DIAST BP <80 MM HG: CPT | Performed by: FAMILY MEDICINE

## 2025-05-20 PROCEDURE — 2500000004 HC RX 250 GENERAL PHARMACY W/ HCPCS (ALT 636 FOR OP/ED): Performed by: NURSE PRACTITIONER

## 2025-05-20 PROCEDURE — 93971 EXTREMITY STUDY: CPT

## 2025-05-20 RX ORDER — TRAMADOL HYDROCHLORIDE 50 MG/1
50 TABLET, FILM COATED ORAL ONCE
Status: COMPLETED | OUTPATIENT
Start: 2025-05-20 | End: 2025-05-20

## 2025-05-20 RX ORDER — PREDNISONE 10 MG/1
TABLET ORAL
Qty: 45 EACH | Refills: 0 | Status: SHIPPED | OUTPATIENT
Start: 2025-05-20 | End: 2025-05-31

## 2025-05-20 RX ORDER — TRAMADOL HYDROCHLORIDE 50 MG/1
50 TABLET, FILM COATED ORAL EVERY 6 HOURS PRN
Qty: 10 TABLET | Refills: 0 | Status: SHIPPED | OUTPATIENT
Start: 2025-05-20 | End: 2025-05-23

## 2025-05-20 RX ORDER — HYDROXYZINE HYDROCHLORIDE 25 MG/1
25 TABLET, FILM COATED ORAL EVERY 6 HOURS
Qty: 12 TABLET | Refills: 0 | Status: SHIPPED | OUTPATIENT
Start: 2025-05-20 | End: 2025-05-23

## 2025-05-20 RX ORDER — PREDNISONE 10 MG/1
60 TABLET ORAL ONCE
Status: COMPLETED | OUTPATIENT
Start: 2025-05-20 | End: 2025-05-20

## 2025-05-20 RX ADMIN — TRAMADOL HYDROCHLORIDE 50 MG: 50 TABLET, COATED ORAL at 11:08

## 2025-05-20 RX ADMIN — PREDNISONE 60 MG: 10 TABLET ORAL at 12:11

## 2025-05-20 ASSESSMENT — ENCOUNTER SYMPTOMS
SHORTNESS OF BREATH: 0
CHILLS: 0
SLEEP DISTURBANCE: 0
APPETITE CHANGE: 0
ABDOMINAL PAIN: 0
ACTIVITY CHANGE: 0
LIGHT-HEADEDNESS: 0
FEVER: 0
FATIGUE: 1
DIZZINESS: 0

## 2025-05-20 ASSESSMENT — PATIENT HEALTH QUESTIONNAIRE - PHQ9
1. LITTLE INTEREST OR PLEASURE IN DOING THINGS: NOT AT ALL
2. FEELING DOWN, DEPRESSED OR HOPELESS: NOT AT ALL
SUM OF ALL RESPONSES TO PHQ9 QUESTIONS 1 AND 2: 0

## 2025-05-20 ASSESSMENT — PAIN SCALES - GENERAL
PAINLEVEL_OUTOF10: 2
PAINLEVEL_OUTOF10: 2
PAINLEVEL_OUTOF10: 9

## 2025-05-20 ASSESSMENT — PAIN DESCRIPTION - DESCRIPTORS: DESCRIPTORS: PRESSURE

## 2025-05-20 ASSESSMENT — PAIN DESCRIPTION - FREQUENCY: FREQUENCY: CONSTANT/CONTINUOUS

## 2025-05-20 ASSESSMENT — COLUMBIA-SUICIDE SEVERITY RATING SCALE - C-SSRS
2. HAVE YOU ACTUALLY HAD ANY THOUGHTS OF KILLING YOURSELF?: NO
1. IN THE PAST MONTH, HAVE YOU WISHED YOU WERE DEAD OR WISHED YOU COULD GO TO SLEEP AND NOT WAKE UP?: NO
6. HAVE YOU EVER DONE ANYTHING, STARTED TO DO ANYTHING, OR PREPARED TO DO ANYTHING TO END YOUR LIFE?: NO

## 2025-05-20 ASSESSMENT — LIFESTYLE VARIABLES
HAVE PEOPLE ANNOYED YOU BY CRITICIZING YOUR DRINKING: NO
TOTAL SCORE: 0
EVER HAD A DRINK FIRST THING IN THE MORNING TO STEADY YOUR NERVES TO GET RID OF A HANGOVER: NO
EVER FELT BAD OR GUILTY ABOUT YOUR DRINKING: NO
HAVE YOU EVER FELT YOU SHOULD CUT DOWN ON YOUR DRINKING: NO

## 2025-05-20 ASSESSMENT — PAIN DESCRIPTION - PAIN TYPE: TYPE: ACUTE PAIN

## 2025-05-20 ASSESSMENT — PAIN - FUNCTIONAL ASSESSMENT
PAIN_FUNCTIONAL_ASSESSMENT: 0-10
PAIN_FUNCTIONAL_ASSESSMENT: 0-10

## 2025-05-20 ASSESSMENT — PAIN DESCRIPTION - LOCATION: LOCATION: LEG

## 2025-05-20 ASSESSMENT — PAIN DESCRIPTION - ORIENTATION: ORIENTATION: LEFT

## 2025-05-20 NOTE — PROGRESS NOTES
Subjective   Patient ID: Magda Paz is a 74 y.o. female who presents for Rash (Dry, red, cracked rash On Left  leg and foot X 3-4 weeks, went to Urgent Care 5/13, now on B/L arms X 1 week).    HPI   Here for rash   Went to  5/13/25 and was diagnosed with cellulitis of left lower extremity   Just had hernia surgery beginning of may   Was diagnosed with cellulitis. Was started on keflex 500 mg bid for 7 days   Ptient completed antibiotic course and redness, crackling and swelling worsened and she states that it's spreading farther up her leg. It's also now on her arms. States that she is having a ahrd time walking due to the pain   Per pt, she thinks it started out as a bite onher foot that started spreading up her leg. This redness improved once she was given antibiotics during her surgery (ancef) but resumed shortly after the surgery.     Review of Systems   Constitutional:  Positive for fatigue. Negative for activity change, appetite change, chills and fever.   Eyes:  Negative for visual disturbance.   Respiratory:  Negative for shortness of breath.    Cardiovascular:  Negative for chest pain.   Gastrointestinal:  Negative for abdominal pain.   Skin:  Negative for rash.   Neurological:  Negative for dizziness and light-headedness.   Psychiatric/Behavioral:  Negative for sleep disturbance.        Objective   /72   Pulse 68   Temp 36.4 °C (97.6 °F)   Wt 74.1 kg (163 lb 6.4 oz)   SpO2 97%   BMI 28.95 kg/m²     Physical Exam  Constitutional:       Appearance: Normal appearance.   Eyes:      Pupils: Pupils are equal, round, and reactive to light.   Cardiovascular:      Rate and Rhythm: Normal rate and regular rhythm.   Pulmonary:      Effort: Pulmonary effort is normal.      Breath sounds: Normal breath sounds.   Abdominal:      General: Abdomen is flat. Bowel sounds are normal.      Palpations: Abdomen is soft.   Musculoskeletal:         General: Normal range of motion.   Skin:     General: Skin is  warm.      Findings: Erythema present.      Comments: LLE with excoriations, 1-2+ pitting edema, maculopapular patches of erythema, crusted, weeping, very ttp. Pulses palpable on dorsalis pedis but bot posterior tibilais. Also has pinpoint eryhematous lesions on her forearms bilaterally    Neurological:      General: No focal deficit present.      Mental Status: She is alert.   Psychiatric:         Mood and Affect: Mood normal.         Assessment/Plan   Assessment & Plan  Cellulitis of left lower extremity  Due to failed out patient therapy for cellulitis, sent pt to ED for possible debridement, possible IV antibiotics. Likely will need woundcare as outpatinet as well. Also failed IV abx ancef during surgery. Differential include dvt due to pt's immobility after surgery

## 2025-05-20 NOTE — ED PROVIDER NOTES
"    HPI   Chief Complaint   Patient presents with    Leg Swelling     Pt presents to the ED for left leg swelling and a rash. Pt reports that she noticed this the last week of April prior to her hernia surgery. Pt reports that the swelling and rash has progressed since then. Pt reports that it is very painful to walk and says it throbs when she elevates it. She was placed on antibiotics from urgent care but it still hasn't gotten better.    Rash       This is a 74-year-old  female presenting to the emergency room with complaints of a rash to her lower extremities.  Patient reports that the rash started about a month ago.  The patient had surgery and reports that she received Ancef IV after which the rash improved briefly.  The rash came back and then spread to her bilateral upper extremities.  She describes it as a itchy rash with burning pain.  She was seen at the urgent care a week ago and was given a prescription for Keflex.  She is taken the Keflex with no improvement of her symptoms.  She saw her primary care physician today and was sent to the emergency room for definitive evaluation.  She was concerned that she may have a blood clot due to her recent surgery.  The patient does not have any history of DVTs or PEs.  She denies any difficulty swallowing or breathing.  She is not having any fever or cold-like symptoms.  The patient denies any chest pain, shortness of breath, dizziness, palpitations, paresthesia, or focal weakness.      History provided by:  Patient   used: No                          Blaze Coma Scale Score: 15                  Patient History   Medical History[1]  Surgical History[2]  Family History[3]  Social History[4]    Physical Exam   Visit Vitals  /77 (BP Location: Left arm, Patient Position: Sitting)   Pulse 70   Temp 36.9 °C (98.4 °F)   Resp 16   Ht 1.6 m (5' 3\")   Wt 73.5 kg (162 lb)   SpO2 96%   BMI 28.70 kg/m²   OB Status Postmenopausal   Smoking " Status Former   BSA 1.81 m²      Physical Exam  Vitals and nursing note reviewed.   HENT:      Head: Normocephalic.      Right Ear: External ear normal.      Left Ear: External ear normal.      Nose: Nose normal.      Mouth/Throat:      Mouth: Mucous membranes are moist.      Pharynx: Oropharynx is clear.   Eyes:      Extraocular Movements: Extraocular movements intact.      Conjunctiva/sclera: Conjunctivae normal.      Pupils: Pupils are equal, round, and reactive to light.   Cardiovascular:      Rate and Rhythm: Normal rate and regular rhythm.      Pulses: Normal pulses.      Heart sounds: Normal heart sounds.   Pulmonary:      Effort: Pulmonary effort is normal.      Breath sounds: Normal breath sounds.   Musculoskeletal:         General: Normal range of motion.      Cervical back: Normal range of motion.      Left lower leg: Edema present.   Skin:     Capillary Refill: Capillary refill takes less than 2 seconds.      Comments: The patient has a maculopapular rash noted to the bilateral upper extremities and left lower extremity.  The rash becomes confluent towards the ankle and the foot.  The patient has overlying dry skin and dry plaques noted to the dorsal aspect of the foot.  Please see pictures for full details.   Neurological:      General: No focal deficit present.      Mental Status: She is alert.   Psychiatric:         Mood and Affect: Mood normal.               Lower extremity venous duplex left   Final Result   No sonographic evidence for deep vein thrombosis within the evaluated   veins of the left lower extremity.        MACRO:   None        Signed by: Tyron Wilson 5/20/2025 1:57 PM   Dictation workstation:   [id number]          Labs Reviewed - No data to display      ED Course & MDM   Diagnoses as of 05/20/25 1451   Rash           Medical Decision Making  The patient was seen and evaluated by the the attending physician, Dr. Sorensen.  The patient is presenting to the emergency room with complaints  of a rash to the left lower extremity.  Differential diagnosis includes contact dermatitis, vasculitis, eczema with superimposed infection, allergic reaction to medication, or other acute process.  Less likely differentials including DVT.  An ultrasound was performed and was negative for acute occlusive thrombus.  Patient was medicated with prednisone 60 mg p.o. and tramadol 50 mg p.o. for pain.  We have a very low suspicion for SJS or TEN syndrome.  The rash appears more allergic in nature.  The patient is not having any signs of anaphylaxis.  We felt that the patient would benefit from a dermatology evaluation.  She was referred to dermatology for further evaluation.  She was placed on a prednisone taper and given Atarax and Ultram for home administration.  She is to return if she has any worsening symptomatology.  The patient was discharged in stable condition with computer discharge instructions given.           Your medication list        START taking these medications        Instructions Last Dose Given Next Dose Due   hydrOXYzine HCL 25 mg tablet  Commonly known as: Atarax      Take 1 tablet (25 mg) by mouth every 6 hours for 3 days.       predniSONE 10 mg tablets,dose pack  Start taking on: May 20, 2025      Take 60 mg by mouth once daily for 5 days, THEN 50 mg once daily for 1 day, THEN 40 mg once daily for 1 day, THEN 30 mg once daily for 1 day, THEN 20 mg once daily for 1 day, THEN 20 mg once daily for 1 day, THEN 10 mg once daily for 1 day.       traMADol 50 mg tablet  Commonly known as: Ultram      Take 1 tablet (50 mg) by mouth every 6 hours if needed for severe pain (7 - 10) for up to 3 days.              ASK your doctor about these medications        Instructions Last Dose Given Next Dose Due   albuterol 90 mcg/actuation inhaler           albuterol 2.5 mg /3 mL (0.083 %) nebulizer solution      Take 3 mL (2.5 mg) by nebulization every 4 hours if needed for wheezing.       Anoro Ellipta 62.5-25  mcg/actuation blister with inhaler device  Generic drug: umeclidinium-vilanteroL      Inhale 1 puff once daily.       aspirin 81 mg EC tablet           atorvastatin 40 mg tablet  Commonly known as: Lipitor      Take 1 tablet (40 mg) by mouth once daily at bedtime.       azelastine 137 mcg (0.1 %) nasal spray  Commonly known as: Astelin      Administer 1 spray into each nostril 2 times a day. Use in each nostril as directed       buPROPion  mg 24 hr tablet  Commonly known as: Wellbutrin XL      Take 1 tablet (300 mg) by mouth once daily.       Caltrate with Vitamin D3 600 mg-20 mcg (800 unit) tablet  Generic drug: calcium carbonate-vitamin D3           cephalexin 500 mg capsule  Commonly known as: Keflex      Take 1 capsule (500 mg) by mouth 2 times a day for 7 days.       cetirizine 5 mg tablet  Commonly known as: ZyrTEC      TAKE 1 TABLET BY MOUTH ONCE DAILY IN THE MORNING       citalopram 10 mg tablet  Commonly known as: CeleXA      Take 1 tablet (10 mg) by mouth once daily.       Daily Multi-Vitamin tablet  Generic drug: multivitamin           famotidine 40 mg tablet  Commonly known as: Pepcid      TAKE 1 TABLET BY MOUTH AT BEDTIME       fluticasone 50 mcg/actuation nasal spray  Commonly known as: Flonase      Administer 1 spray into each nostril 2 times a day.       levothyroxine 50 mcg tablet  Commonly known as: Synthroid, Levoxyl      Take 1 tablet (50 mcg) by mouth once daily.       lisinopril 10 mg tablet      Take 1 tablet (10 mg) by mouth once daily.       montelukast 10 mg tablet  Commonly known as: Singulair      Take 1 tablet (10 mg) by mouth once daily at bedtime.       omeprazole 20 mg DR capsule  Commonly known as: PriLOSEC      Take 1 capsule (20 mg) by mouth once daily.       oxyCODONE-acetaminophen 5-325 mg tablet  Commonly known as: Percocet      Take 1 tablet by mouth every 6 hours if needed for severe pain (7 - 10).       polyethylene glycol 17 gram/dose powder  Commonly known as:  Miralax      Mix 17 g (1capful) in 6-8oz of liquid of powder and drink once daily.       rOPINIRole 3 mg tablet  Commonly known as: Requip      Take 1 tablet (3 mg) by mouth 3 times a day.       traZODone 50 mg tablet  Commonly known as: Desyrel      Take 1 tablet (50 mg) by mouth once daily at bedtime.                 Where to Get Your Medications        These medications were sent to [facility] [address] [place] [address] [address]  [address] [facility] [address]      Phone: [phone]   hydrOXYzine HCL 25 mg tablet  predniSONE 10 mg tablets,dose pack  traMADol 50 mg tablet         Procedure       *This report was transcribed using voice recognition software.  Every effort was made to ensure accuracy; however, inadvertent computerized transcription errors may be present.*  Clarita Zamora APRN-CNP  05/20/25           [1]   Past Medical History:  Diagnosis Date    Allergic     Arthritis     Cancer (Multi)     Chronic kidney disease     Colon polyp 2022    COPD (chronic obstructive pulmonary disease) (Multi)     Coronary artery disease     Depression     Enterocolitis due to Clostridium difficile, not specified as recurrent 07/01/2015    C. difficile colitis    GERD (gastroesophageal reflux disease)     History of transfusion 2016    Hyperlipidemia     Hypertension     Hypothyroidism     Lung disease 2023    Personal history of malignant neoplasm of bladder     History of malignant neoplasm of bladder    Personal history of other diseases of the musculoskeletal system and connective tissue 07/01/2015    History of arthritis    Personal history of other diseases of the musculoskeletal system and connective tissue 07/01/2015    History of osteoarthritis    Personal history of other diseases of the nervous system and sense organs 07/01/2015    History of migraine    Restless legs syndrome 07/01/2015    Restless legs syndrome    Sleep apnea    [2]   Past Surgical History:  Procedure Laterality Date     CT ANGIO NECK  12/01/2022    CT NECK ANGIO W AND WO IV CONTRAST 12/1/2022 [facility]    ELBOW SURGERY Left     nerve    HERNIA REPAIR Left 05/06/2025    Left Inguinal    OTHER SURGICAL HISTORY  05/29/2019    Dilation and curettage    OTHER SURGICAL HISTORY  05/29/2019    Cervical disc replacement    ROTATOR CUFF REPAIR      TONSILLECTOMY      TUBAL LIGATION     [3]   Family History  Problem Relation Name Age of Onset    Cervical cancer Mother Lea Saha     Cancer Mother Lea Saha     Cancer Sister  50   [4]   Social History  Tobacco Use    Smoking status: Former     Current packs/day: 0.00     Average packs/day: 1.5 packs/day for 15.0 years (22.5 ttl pk-yrs)     Types: Cigarettes     Quit date: 5/15/2015     Years since quitting: 10.0    Smokeless tobacco: Never   Vaping Use    Vaping status: Never Used   Substance Use Topics    Alcohol use: Never    Drug use: Never        CHELSEA Knight-CNP  05/20/25 1457

## 2025-07-18 ENCOUNTER — APPOINTMENT (OUTPATIENT)
Dept: UROLOGY | Facility: CLINIC | Age: 75
End: 2025-07-18
Payer: MEDICARE

## 2025-07-22 ENCOUNTER — HOSPITAL ENCOUNTER (OUTPATIENT)
Dept: RADIOLOGY | Facility: HOSPITAL | Age: 75
Discharge: HOME | End: 2025-07-22
Payer: MEDICARE

## 2025-07-22 DIAGNOSIS — Z87.891 FORMER CIGARETTE SMOKER: ICD-10-CM

## 2025-07-22 PROCEDURE — 71271 CT THORAX LUNG CANCER SCR C-: CPT | Performed by: RADIOLOGY

## 2025-07-22 PROCEDURE — 71271 CT THORAX LUNG CANCER SCR C-: CPT

## 2025-07-24 RX ORDER — CIPROFLOXACIN 500 MG/1
500 TABLET, FILM COATED ORAL ONCE
Status: COMPLETED | OUTPATIENT
Start: 2025-07-24 | End: 2025-07-25

## 2025-07-24 RX ORDER — LIDOCAINE HYDROCHLORIDE 20 MG/ML
1 JELLY TOPICAL ONCE
Status: COMPLETED | OUTPATIENT
Start: 2025-07-24 | End: 2025-07-25

## 2025-07-25 ENCOUNTER — APPOINTMENT (OUTPATIENT)
Dept: UROLOGY | Facility: CLINIC | Age: 75
End: 2025-07-25
Payer: MEDICARE

## 2025-07-25 DIAGNOSIS — Z85.51 HISTORY OF BLADDER CANCER: Primary | ICD-10-CM

## 2025-07-25 DIAGNOSIS — R19.09 GROIN LUMP: ICD-10-CM

## 2025-07-25 DIAGNOSIS — R10.32 LEFT LOWER QUADRANT ABDOMINAL PAIN: ICD-10-CM

## 2025-07-25 LAB
POC BILIRUBIN, URINE: NEGATIVE
POC BLOOD, URINE: ABNORMAL
POC GLUCOSE, URINE: NEGATIVE MG/DL
POC KETONES, URINE: NEGATIVE MG/DL
POC LEUKOCYTES, URINE: NEGATIVE
POC NITRITE,URINE: NEGATIVE
POC PH, URINE: 5.5 PH
POC PROTEIN, URINE: NEGATIVE MG/DL
POC SPECIFIC GRAVITY, URINE: 1.02
POC UROBILINOGEN, URINE: 0.2 EU/DL

## 2025-07-25 PROCEDURE — 81003 URINALYSIS AUTO W/O SCOPE: CPT | Performed by: UROLOGY

## 2025-07-25 PROCEDURE — 52000 CYSTOURETHROSCOPY: CPT | Performed by: UROLOGY

## 2025-07-25 RX ADMIN — LIDOCAINE HYDROCHLORIDE 1 APPLICATION: 20 JELLY TOPICAL at 10:43

## 2025-07-25 RX ADMIN — CIPROFLOXACIN 500 MG: 500 TABLET, FILM COATED ORAL at 10:43

## 2025-07-25 NOTE — PROGRESS NOTES
07/25/2025  Cystoscopy    Voiding well no blood in urine, status post hernia repair    A cystoscopy was performed under local without difficulty    Findings: Normal urethra normal bladder no tumor no stone    Pain evaluation: 0/10 before, 2/10 after.    We discussed bladder cancer surveillance every year cystoscopy  All the questions were answered, the patient expressed understanding and agreed to the plan.    Impression  Left groin lump  Bladder cancer  Urinary incontinence     Plan  Cystoscopy in a year    Chief Complaint   Patient presents with    Bladder Cancer     Patient is here today for cysto for history of bladder cancer.        Physical Exam     TODAYS LAB RESULTS:    POCT UA Automated manually resulted  Order: [id number]   Status: Final result       Next appt: 07/29/2025 at 10:00 AM in Pulmonology (Clemente Barrow MD)       Dx: History of bladder cancer; Groin lump...    Test Result Released: Yes (not seen)    0 Result Notes        Component  Ref Range & Units 10:42 4 mo ago 1 yr ago   POC Glucose, Urine  NEGATIVE mg/dl NEGATIVE NEGATIVE NEGATIVE   POC Bilirubin, Urine  NEGATIVE NEGATIVE NEGATIVE NEGATIVE   POC Ketones, Urine  NEGATIVE mg/dl NEGATIVE NEGATIVE NEGATIVE   POC Specific Gravity, Urine  1.005 - 1.035 1.020 1.010 1.020   POC Blood, Urine  NEGATIVE TRACE-Intact Abnormal  SMALL (1+) Abnormal  TRACE-Intact Abnormal    POC PH, Urine  No Reference Range Established PH 5.5 6.5 5.5   POC Protein, Urine  NEGATIVE mg/dl NEGATIVE NEGATIVE NEGATIVE R   POC Urobilinogen, Urine  0.2, 1.0 EU/DL 0.2 0.2 0.2   Poc Nitrite, Urine  NEGATIVE NEGATIVE NEGATIVE NEGATIVE   POC Leukocytes, Urine  NEGATIVE NEGATIVE NEGATIVE NEGATIVE             Specimen Collected: 07/25/25 10:42 Last Resulted: 07/25/25 10:42        Lab Flowsheet        Order Details        View Encounter        Lab and Collection Details        Routing        Result History     View All Conversations on this Encounter         === 07/22/25 ===    CT  LUNG SCREENING LOW DOSE    - Impression -  1.  Few small bilateral noncalcified pulmonary nodules measuring up  to 0.7 cm and stable, likely benign. Continued screening with  low-dose noncontrast chest CT in 12 months (from current date) is  recommended.  2. Mild upper lung predominant emphysema and smoking related  interstitial changes.  3. Mild coronary artery calcifications.  4. Mildly dilated main pulmonary artery which can be seen with  pulmonary hypertension.  5. Suggestion of left atrial enlargement. Correlate with  echocardiography.  6. Circumferential wall thickening of the lower esophagus and  correlate with concern for gastroesophageal reflux.    LUNG RADS CATEGORY:  Lung Rad: Lung-RADS 2, S (Benign Appearance or Indolent Behavior)  Recommendation: Continue annual screening with Low Dose Chest CT in  12 months, recommended as per American College of Radiology  Guidelines Lung-RADS Version 2022. Lung-RADS S (Other non-nodular  findings) Management as appropriate to finding per American College  of Radiology Guidelines Lung-RADS Version 2022.        *    MACRO:  None    Signed by: Oz Vasquez 7/22/2025 12:13 PM  Dictation workstation:   [id number]     ASSESSMENT&PLAN:      IMPRESSIONS:  03/13/2025  Acute onset left lower quadrant pain and fell and lump groin area patient her self.  Currently no pain     We discussed left lower quadrant pain and lump  We discussed the possibility of hernia  We discussed the CT scan abdomen and pelvis  All the questions were answered, the patient expressed understanding and agreed to the plan.     Impression  Left lower quadrant pain  Left groin lump  Bladder cancer  Urinary incontinence     Plan  CT abdomen and pelvis without contrast for left lower quadrant pain and lump  Appointment in 2 weeks       Chief Complaint   Patient presents with    Bladder Cancer       Pt is here today for groin/abdominal pain on the left side. She denies any voiding issues at this time.          Physical Exam      TODAYS LAB RESULTS:  POCT UA Automated manually resulted  Order: [id number]   Status: Final result       Visible to patient: Yes (not seen)       Next appt: 03/14/2025 at 07:00 AM in Radiology (POR MAMMO 1)       Dx: History of bladder cancer    0 Result Notes          Component  Ref Range & Units 15:48 8 mo ago   POC Glucose, Urine  NEGATIVE mg/dl NEGATIVE NEGATIVE   POC Bilirubin, Urine  NEGATIVE NEGATIVE NEGATIVE   POC Ketones, Urine  NEGATIVE mg/dl NEGATIVE NEGATIVE   POC Specific Gravity, Urine  1.005 - 1.035 1.010 1.020   POC Blood, Urine  NEGATIVE SMALL (1+) Abnormal  TRACE-Intact Abnormal    POC PH, Urine  No Reference Range Established PH 6.5 5.5   POC Protein, Urine  NEGATIVE mg/dl NEGATIVE NEGATIVE R   POC Urobilinogen, Urine  0.2, 1.0 EU/DL 0.2 0.2   Poc Nitrite, Urine  NEGATIVE NEGATIVE NEGATIVE   POC Leukocytes, Urine  NEGATIVE NEGATIVE NEGATIVE                Specimen Collected: 03/13/25 15:48 Last Resulted: 03/13/25 15:48         ASSESSMENT&PLAN:        IMPRESSIONS:      07/12/2024  Cystoscopy     A cystoscopy was performed under local without difficulty     Findings: Normal urethra, normal bladder no stone no tumor     We discussed bladder cancer, surveillance cystoscopy yearly  We discussed urinary incontinence status post of urethral sling  All the questions were answered, the patient expressed understanding and agreed to the plan.     Impression  Bladder cancer  Urinary incontinence     Plan  Cystoscopy in a year        Subjective  Patient ID: Magda Paz is a 73 y.o. female who presents for Bladder Cancer (Patient is here today for yearly cysto was Dr. Iraheta pt).  HPI     Review of Systems        Objective  Physical Exam        Assessment/Plan        Slava Rider MD 07/12/24 12:31 PM      5/29/2019 [facility]      68 year old new patient referred from Dr. Cuellar due to hematuria. History of smoking. Per Dr. Cuellar's notes from 5/21/19, patient had a D&C  with hysterectomy done 4/23/19 due to post menopausal bleeding. Patient has noticed blood in the toilet and on the toilet paper intermittently for the last 2 years and consistently for the last 3 months. She also notices blood on her undergarments when she is very active. Admits to suprapubic pressure, frequency, urgency and mixed urinary incontinence. Straight catheterized patient for 25 cc of clear yellow urine. UA positive for blood (+++). Discussed hematuria workup with cystoscopy and CT of the abdomen and pelvis with and without contrast in order to rule out carcinoma of the bladder. All the questions were answered, the patient expressed understanding and agreed to the plan.     Impression   Gross hematuria  Urinary frequency and urgency  Mixed urinary incontinence  Suprapubic pressure      Plan   Urine culture  Urine cytology   CT of the abdomen and pelvis with and without contrast  BUN and Cr   Cystoscopy         Surgery  8/19/2022 urethral sling  3/1/2022 cystoscopy bladder biopsy and fulguration Dr. Iraheta  7/5/2019 [facility]

## 2025-07-29 ENCOUNTER — APPOINTMENT (OUTPATIENT)
Dept: PULMONOLOGY | Facility: HOSPITAL | Age: 75
End: 2025-07-29
Payer: MEDICARE

## 2025-08-06 DIAGNOSIS — J30.9 CHRONIC ALLERGIC RHINITIS: ICD-10-CM

## 2025-08-06 RX ORDER — CETIRIZINE HYDROCHLORIDE 5 MG/1
5 TABLET ORAL
Qty: 90 TABLET | Refills: 0 | Status: SHIPPED | OUTPATIENT
Start: 2025-08-06

## 2025-08-12 ENCOUNTER — RESULTS FOLLOW-UP (OUTPATIENT)
Dept: PULMONOLOGY | Facility: HOSPITAL | Age: 75
End: 2025-08-12
Payer: MEDICARE

## 2025-08-30 DIAGNOSIS — J30.9 CHRONIC ALLERGIC RHINITIS: ICD-10-CM

## 2025-09-02 RX ORDER — MONTELUKAST SODIUM 10 MG/1
10 TABLET ORAL NIGHTLY
Qty: 90 TABLET | Refills: 0 | OUTPATIENT
Start: 2025-09-02

## 2025-09-04 ENCOUNTER — HOSPITAL ENCOUNTER (OUTPATIENT)
Dept: VASCULAR MEDICINE | Facility: HOSPITAL | Age: 75
Discharge: HOME | End: 2025-09-04
Payer: MEDICARE

## 2025-09-04 DIAGNOSIS — R09.89 CAROTID BRUIT, UNSPECIFIED LATERALITY: ICD-10-CM

## 2025-09-04 PROCEDURE — 93880 EXTRACRANIAL BILAT STUDY: CPT

## 2025-09-04 PROCEDURE — 93880 EXTRACRANIAL BILAT STUDY: CPT | Performed by: SURGERY

## 2025-09-15 ENCOUNTER — APPOINTMENT (OUTPATIENT)
Dept: PRIMARY CARE | Facility: CLINIC | Age: 75
End: 2025-09-15
Payer: MEDICARE

## 2026-07-24 ENCOUNTER — APPOINTMENT (OUTPATIENT)
Dept: UROLOGY | Facility: CLINIC | Age: 76
End: 2026-07-24
Payer: MEDICARE

## (undated) DEVICE — SUTURE, VICRYL PLUS 3-0, SH, 27IN

## (undated) DEVICE — SYRINGE, 10 CC, LUER LOCK

## (undated) DEVICE — DRAPE, SHEET, ENDOSCOPY, GENERAL, FENESTRATED, ARMBOARD COVER, 98 X 123.5 IN, DISPOSABLE, LF, STERILE

## (undated) DEVICE — SUTURE, VICRYL, 3-0, 18 IN, TIE, VIOLET

## (undated) DEVICE — PAD, GROUNDING, ELECTROSURGICAL, W/9 FT CABLE, POLYHESIVE II, ADULT, LF

## (undated) DEVICE — GLOVE, SURGICAL, PROTEXIS PI BLUE W/NEUTHERA, 6.5, PF, LF

## (undated) DEVICE — COVER HANDLE LIGHT, STERIS, BLUE, STERILE

## (undated) DEVICE — GLOVE, PROTEXIS PI CLASSIC, SZ-6.5, PF, LF

## (undated) DEVICE — TISSUE ADHESIVE, EXOFIN, 1ML

## (undated) DEVICE — SUTURE, PROLENE, 2-0, 30 IN, SH, BLUE

## (undated) DEVICE — Device

## (undated) DEVICE — SOLUTION, IRRIGATION, SODIUM CHLORIDE 0.9%, 1000 ML, POUR BOTTLE

## (undated) DEVICE — DRAIN, PENROSE, 0.5 X 12 IN, LATEX, STERILE

## (undated) DEVICE — SPONGE, GAUZE, XRAY DECT, 16 PLY, 4 X 4, W/MASTER WDMT,STERILE

## (undated) DEVICE — APPLICATOR, CHLORAPREP, W/ORANGE TINT, 26ML

## (undated) DEVICE — SUTURE, VICRYL, 2-0, 27 IN, SH, UNDYED

## (undated) DEVICE — SUTURE, VICRYL, 3-0, 27 IN, SH

## (undated) DEVICE — SUTURE, VICRYL, 4-0, 18 IN, UNDYED BR PS-2